# Patient Record
(demographics unavailable — no encounter records)

---

## 2024-10-14 NOTE — PHYSICAL EXAM
[General Appearance - Alert] : alert [General Appearance - In No Acute Distress] : in no acute distress [Sclera] : the sclera and conjunctiva were normal [PERRL With Normal Accommodation] : pupils were equal in size, round, and reactive to light [Extraocular Movements] : extraocular movements were intact [Outer Ear] : the ears and nose were normal in appearance [Oropharynx] : the oropharynx was normal [Neck Appearance] : the appearance of the neck was normal [Neck Cervical Mass (___cm)] : no neck mass was observed [Jugular Venous Distention Increased] : there was no jugular-venous distention [Respiration, Rhythm And Depth] : normal respiratory rhythm and effort [Exaggerated Use Of Accessory Muscles For Inspiration] : no accessory muscle use [Auscultation Breath Sounds / Voice Sounds] : lungs were clear to auscultation bilaterally [Heart Rate And Rhythm] : heart rate was normal and rhythm regular [Heart Sounds] : normal S1 and S2 [Heart Sounds Gallop] : no gallops [Murmurs] : no murmurs [Heart Sounds Pericardial Friction Rub] : no pericardial rub [Edema] : there was no peripheral edema [Veins - Varicosity Changes] : there were no varicosital changes [Bowel Sounds] : normal bowel sounds [Abdomen Soft] : soft [Abdomen Tenderness] : non-tender [No CVA Tenderness] : no ~M costovertebral angle tenderness [No Spinal Tenderness] : no spinal tenderness [Abnormal Walk] : normal gait [Involuntary Movements] : no involuntary movements were seen [Skin Color & Pigmentation] : normal skin color and pigmentation [Skin Turgor] : normal skin turgor [] : no rash [Deep Tendon Reflexes (DTR)] : deep tendon reflexes were 2+ and symmetric [Sensation] : the sensory exam was normal to light touch and pinprick [No Focal Deficits] : no focal deficits [FreeTextEntry1] : Anxious affect/mood

## 2024-10-14 NOTE — PHYSICAL EXAM
[General Appearance - Alert] : alert [General Appearance - In No Acute Distress] : in no acute distress [Sclera] : the sclera and conjunctiva were normal [PERRL With Normal Accommodation] : pupils were equal in size, round, and reactive to light [Extraocular Movements] : extraocular movements were intact [Outer Ear] : the ears and nose were normal in appearance [Oropharynx] : the oropharynx was normal [Neck Appearance] : the appearance of the neck was normal [Neck Cervical Mass (___cm)] : no neck mass was observed [Jugular Venous Distention Increased] : there was no jugular-venous distention [Respiration, Rhythm And Depth] : normal respiratory rhythm and effort [Exaggerated Use Of Accessory Muscles For Inspiration] : no accessory muscle use [Auscultation Breath Sounds / Voice Sounds] : lungs were clear to auscultation bilaterally [Heart Rate And Rhythm] : heart rate was normal and rhythm regular [Heart Sounds] : normal S1 and S2 [Heart Sounds Gallop] : no gallops [Murmurs] : no murmurs [Heart Sounds Pericardial Friction Rub] : no pericardial rub [Edema] : there was no peripheral edema [Veins - Varicosity Changes] : there were no varicosital changes [Bowel Sounds] : normal bowel sounds [Abdomen Tenderness] : non-tender [Abdomen Soft] : soft [No CVA Tenderness] : no ~M costovertebral angle tenderness [No Spinal Tenderness] : no spinal tenderness [Abnormal Walk] : normal gait [Involuntary Movements] : no involuntary movements were seen [Skin Color & Pigmentation] : normal skin color and pigmentation [Skin Turgor] : normal skin turgor [] : no rash [Deep Tendon Reflexes (DTR)] : deep tendon reflexes were 2+ and symmetric [Sensation] : the sensory exam was normal to light touch and pinprick [No Focal Deficits] : no focal deficits [FreeTextEntry1] : Anxious affect/mood

## 2024-10-14 NOTE — ASSESSMENT
[FreeTextEntry1] : Patient is a 74 yo F with PR3+ ANCA vasculitis diagnosed 2020, HTN, recent renal involvement now on rituximab  PR3+ ANCA vasculitis affecting kidney, joints - reviewed lab results in detail, at goal Continue rituximab, off steroids Has now completed two years, will discuss need to continue with rheum, given proteinuria my onus would be to continue  HTN - would continue to hold losartan for now, continue amlodipine and carvedilol for now - at goal  Gait abnormality - following with neuro, pain management, physical therapy for bursitis and tendon tear  Depression - discussed psychiatry, has her own, will reach out. Likely combination of underlying anxiety and adjustment disorder  RTC in 3-4 months with labs prior.

## 2024-10-14 NOTE — HISTORY OF PRESENT ILLNESS
[FreeTextEntry1] : Patient is a 72 yo F with PR3+ ANCA vasculitis diagnosed 2020, HTN, recent renal involvement now on rituximab presenting for followup   Major issue is depression, has a psychiatrist who doesnt take medicare but will look into others on the medicare website.   Medicare not covering further ritux, had dose in september ANCA levels excellent

## 2024-10-14 NOTE — ASSESSMENT
[FreeTextEntry1] : Patient is a 72 yo F with PR3+ ANCA vasculitis diagnosed 2020, HTN, recent renal involvement now on rituximab  PR3+ ANCA vasculitis affecting kidney, joints - reviewed lab results in detail, at goal Continue rituximab, off steroids Has now completed two years, will discuss need to continue with rheum, given proteinuria my onus would be to continue  HTN - would continue to hold losartan for now, continue amlodipine and carvedilol for now - at goal  Gait abnormality - following with neuro, pain management, physical therapy for bursitis and tendon tear  Depression - discussed psychiatry, has her own, will reach out. Likely combination of underlying anxiety and adjustment disorder  RTC in 3-4 months with labs prior.

## 2024-11-07 NOTE — DATA REVIEWED
[FreeTextEntry1] : Coronary Arteries (segment number):  LM (5): Normal.  LAD: Prox (6): Normal. Mid (7): Normal. Distal (8): Normal. D1 (9): High take off, normal. D2 (10): Minimal stenosis due to calcified and noncalcified plaque.  LCX: Prox (11): Normal. OM1(12): Normal. Mid (13): Normal. OM2 (14): Normal.  RCA: Prox (1): Normal. Mid (2): Motion, minimal stenosis Distal (3): Normal. RPDA (4): Normal. RPL (16): Normal.      {% area stenosis: Normal = 0%; Minimal = 1 to 29%; Mild = 30 to 49%; Moderate= 50 to 69%; Severe= 70 to 90%; Subtotal > or = 91%        } Lesion type: Calcified; Noncalcified; Predominantly Calcified; Predominantly Noncalcified; With Remodeling  C: Additional Cardiac Findings Aortic Dissection: None in the visualized segments of the thoracic aorta. Myocardial structural abnormality: LV: N LA: N RV: N RA: N Atrial septum: Probably normal Ventricular septum: N Pericardium: N Pericardial effusion: N  Non Cardiac Findings:  COMPARISON: CT chest 12/30/2021.  Linear atelectasis of the lingula. Tiny left Bochdalek hernia. Bilateral lung cysts. Adjacent to the cyst in the left lower lobe is a linear opacity that is unchanged. The remainder of the imaged lungs are clear. The included airways are unremarkable. The visualized mediastinum is normal. The included aorta is ectatic. Aortic calcifications. The descending thoracic aorta is tortuous. The upper abdomen is unremarkable.   IMPRESSION:  Cardiac: 1. The calcium score is mild at 12 Agatston units, which is at the 49 percentile, adjusted for age, gender and race. 2. Non-obstructive coronary disease  Non-cardiac: 1. The imaged lung cysts are unchanged. 2. Linear opacity in the left lower lobe adjacent to several lung cyst is unchanged.   Michaelle Marino M.D., Attending Cardiologist Gill Mendez M.D., Attending Radiologist  --- End of Report ---  ACC: [id number] EXAM: US DPLX CAROTIDS COMPL BI  PROCEDURE DATE: 08/19/2022    INTERPRETATION: CLINICAL INFORMATION: Syncope.  COMPARISON: None available.  TECHNIQUE: Grayscale, color and spectral Doppler examination of both carotid arteries was performed.  FINDINGS:  Small plaque in the right carotid bulb. No visible luminal carotid stenosis in either side. No significant plaque in the left carotid artery.  Peak systolic velocities are as follows:  RIGHT: PROX CCA = 100 cm/s DIST CCA = 86 cm/s PROX ICA = 70 cm/s DIST ICA = 134 cm/s ECA = 90 cm/s  LEFT: PROX CCA = 117 cm/s DIST CCA = 61 cm/s PROX ICA = 51 cm/s DIST ICA = 91 cm/s ECA = 76 cm/s  Antegrade flow is noted within both vertebral arteries.  IMPRESSION: Borderline elevated velocities in the distal right ICA without visible luminal narrowing. This finding could be due to hyperdynamic flow or artifact. No significant carotid stenosis is suspected.   MR Shoulder Joint No Cont, Left             Final  No Documents Attached       EXAM: [id number] - MR SHOULDER LT  - ORDERED BY: THUAN RHODES   PROCEDURE DATE:  07/29/2022    INTERPRETATION:  CLINICAL HISTORY: 70-year-old with left shoulder pain.      FINDINGS: MRI of the left shoulder was performed in the axial, coronal and sagittal planes with proton density and fluid sensitive weighting with and without fat suppression. There is no prior study available for comparison.  Evaluation of the shoulder demonstrates tendinosis of the supraspinatus and infraspinatus tendons. Low-grade partial-thickness interstitial tearing of the subscapularis tendon (image 12, axial). The extra-articular biceps tendon is normally located within the bicipital groove. There is mild tendinosis of the intra-articular biceps tendon. There is severe degenerative arthropathy of acromioclavicular joint. There is moderate volume of fluid within the subacromial/subdeltoid bursa (image 9, coronal).  There is no significant joint effusion. Evaluation of the labrum demonstrates a the anteroinferior, posterior inferior and superior labrum are intact. The contour of the humeral head and glenoid are preserved. No significant joint effusion. There is mild irregularity of the anterior/inferior humeral head (image 14, axial). No discrete loss of the glenoid surface. Negative for axillary adenopathy. The inferior glenohumeral ligament and coracohumeral ligament are not thickened. There is no abnormal signal within the rotator interval. No fracture. No dislocation. No muscle and no bone marrow edema.      IMPRESSION:  Moderate tendinosis of the supraspinatus and infraspinatus tendons.  Low-grade interstitial tearing of the subscapularis tendon.  Subacromial/subdeltoid bursitis.       Dr. Carol Yoon can be reached at [email or web address] with questions regarding this report.  --- End of Report ---     US Joint Nonvasc Extremity Limited, Left             Final  No Documents Attached       EXAM: [id number] - US JOINT NONVASC EXT LTD LT  - ORDERED BY: THUAN RHODES   PROCEDURE DATE:  07/13/2022    INTERPRETATION:  History: Left shoulder pain.  Technique: Ultrasound of the left shoulder was performed  Findings:  There is suspicion for partial tear involving the anterior supraspinatus and upper subscapularis insertions. The rotator cuff tendons are otherwise unremarkable. The supraspinatus and infraspinatus muscles show no significant fat atrophy.  The long head of the biceps tendon is intact and unremarkable.  The acromioclavicular joint is unremarkable.  The posterior aspect of the glenohumeral joint is unremarkable.  Impression:  There is suspicion for partial tear involving the anterior supraspinatus and upper subscapularis insertions. Further evaluation with dedicated MRI of the shoulder may be of utility for more definitive characterization as clinically indicated.  --- End of Report ---       MAC CABRERA MD; Attending Radiologist This document has been electronically signed. Jul 13 2022  5:22PM      Ordered by: THUAN RHODES       Collected/Examined: [id number] 10:57AM        Verified by: THUAN RHODES [id number] 06:36PM         Result Communication: No patient communication needed at this time; Stage: Final         Performed at: [facility]       Resulted: [id number] 05:19PM       Last Updated: [id number] 06:36PM       Accession: [id number]           CAROL YOON MD; Attending Radiologist This document has been electronically signed. Jul 29 2022  2:57PM      Ordered by: THUAN RHODES       Collected/Examined: [id number] 02:32PM        Verified by: THUAN RHODES [id number] 04:11PM         Result Communication: No patient communication needed at this time; Stage: Final         Performed at: [facility]       Resulted: [id number] 02:22PM       Last Updated: 29Jul2022 04:11PM       Accession: [id number]           MR Hip No Cont, Left             Final  No Documents Attached       EXAM: [id number] - MR HIP LT  - ORDERED BY: THUAN RHODES   PROCEDURE DATE:  07/13/2022    INTERPRETATION:  History: Left hip pain  Technique: Magnetic resonance imaging of the left hip was performed without intravenous contrast according to standard protocol.  Comparison: None available  Findings:  There is mild diffuse tearing/degeneration of the acetabular labrum. There is mild fissuring of cartilage and subchondral signal abnormality involving the anterosuperior acetabulum. There is no joint effusion. There is high-grade partial tearing of the gluteus minimus enthesis and the lateral half of the gluteus medius insertion..  There is trace trochanteric bursitis. The fat planes surrounding the sciatic nerve are preserved. There is moderate proximal hamstring tendinosis.  There is no fracture or osteonecrosis.  There is no evidence of stress reaction.  The intrapelvic organs are within normal limits.  Impression:  Minimal degenerative changes of the hip.  Partial tearing of the gluteus medius and minimus insertions and associated mild trochanteric bursitis.  Mild diffuse degeneration of the acetabular labrum.  --- End of Report ---       MAC MATTHEW MBA-MOISÉS YANES; Attending Radiologist This document has been electronically signed. Jul 13 2022  2:50PM      Ordered by: THUAN RHODES       Collected/Examined: [id number] 12:59PM        Verified by: THUAN RHODES [id number] 06:35PM         Result Communication: No patient communication needed at this time; Stage: Final         Performed at: [facility]       Resulted: [id number] 02:41PM       Last Updated: 18Jul2022 06:35PM       Accession: [id number]          CT Chest No Cont             Final  No Documents Attached       EXAM:  CT CHEST  PROCEDURE DATE:  06/18/2021     INTERPRETATION:  CT SCAN OF CHEST  History: Follow-up of 1.5 cm left lower lobe nodule.  Technique: CT scan of chest performed from lung apices through lung bases. Axial, coronal, and sagittal multiplanar reformatted images were produced. Thin section axial images and axial MIPS were also produced. Intravenous contrast material was not administered, as ordered.  Comparison: CT 12/20/2020  Findings:  Lungs and large airways: Previously seen 1.5 x 1.1 cm nodule in the posterior basal segment left lower lobe is decreased in size with somewhat discoid appearance, now measuring 1.4 x 0.6 cm. Near complete resolution of previously seen left upper and lower lobe consolidation, now demonstrating mild scarring, minimal traction bronchiectasis, and reticulations. Areas of reticulation, parenchymal distortion and traction bronchiectasis in the right lower lobe and right upper lobe, with interval decrease in extent of associated groundglass opacity. Biapical pleural-parenchymal scarring. Unchanged subcentimeter perifissural nodule along the major fissure.  Pleura:  No pleural effusion.  Mediastinum and hilar regions: No thoracic lymphadenopathy.  Heart and pericardium:  Heart size is normal. No pericardial effusion.  Vessels:  Normal.  Chest wall and lower neck:  Normal.  Upper abdomen: Small hiatal hernia..  Bones: Degenerative changes in the spine..   Impression: 1.  Interval decrease in size of the left lower lobe nodular lesion, now appears to be focal scarring/atelectasis. 2.  Interval near complete resolution of left lung pneumonia. Bilateral multifocal areas of reticulation/scarring reflecting sequela of prior pneumonia.         JAVED HINSON MD; Attending Radiologist This document has been electronically signed. Jun 22 2021 12:46PM      Ordered by: ROLAND ANDRADE       Collected/Examined: 18Jun2021 04:50PM        Verified by: ROLAND ANDRADE 28Jun2021 05:42PM         Result Communication: No patient communication needed at this time; Stage: Final         Performed at: [facility]       Resulted: 22Jun2021 12:34PM       Last Updated: 28Jun2021 05:42PM       Accession: [id number]           DEXA Bone Density Axial with Vertebral Fracture Assessment             Final  No Documents Attached   Result Annotated 03Jan2022 09:15AM by NAIDA OLSON    appt pending to discuss will send to pmr and suggest ca and vit d       EXAM:  XR BONE DENS AXIAL W VERT FX  PROCEDURE DATE:  12/30/2021     INTERPRETATION:  BONE DENSITY STUDY OF THE LUMBAR SPINE AND LEFT HIP VERTEBRAL FRACTURE ANALYSIS  Clinical History: 70 year old postmenopausal female with chronic steroid use.  Baseline examination.  Procedure: Measurements of bone density were made in the lumbar spine and in the left hip using dual x-ray absorptiometry (DEXA).  Vertebral fracture analysis was also performed on a lateral image of the spine.  Results:  Left Hip (Total)  BMD       0.816 g/sq. cm. T-Score  -1.0 SD from the mean Z-Score  0.5 SD from the mean  Femoral neck BMD       0.744 g/sq. cm. T-Score  -0.9 SD from the mean Z-Score  0.9 SD from the mean  FRAX WHO fracture risk assessment tool  10 year fracture risk Major osteoporotic fracture       8.4% Hip fracture                               0.9%   Spine  BMD       0.919 g/sq. cm. T-Score  -1.2 SD from the mean Z-Score  1.0 SD from the mean  Vertebral fracture analysis shows no evidence of compression fracture   Impression:  Osteopenia of the lumbar spine. No evidence of compression fracture.   Legend: BMD = Bone mineral density T-Score = Variance from a young adult population matched for gender and ethnicity Z-Score = Variance from a population matched for age, gender and ethnicity ([employer] [facility])  --- End of Report ---       RUDI FARLEY MD; Attending Radiologist This document has been electronically signed. Dec 30 2021  1:45PM      Ordered by: NAIDA OLSON       Collected/Examined: [id number] 11:54AM        Verified by: NAIDA OLSON 03Jan2022 09:15AM         Result Communication: No patient communication needed at this time; Stage: Final         Performed at: [facility]       Resulted: [id number] 01:43PM       Last Updated: 03Jan2022 09:15AM       Accession: [id number]           CT Chest No Cont             Final  No Documents Attached       EXAM:  CT CHEST  PROCEDURE DATE:  12/30/2021     INTERPRETATION:  CT SCAN OF CHEST  History: Follow-up of left lung nodule.  Technique: CT scan of chest performed from lung apices through lung bases. Axial, coronal, and sagittal multiplanar reformatted images were produced. Thin section axial images and axial MIPS were also produced. Intravenous contrast material was not administered, as ordered.  Comparison: Comparison made with most recent chest CT from 6/18/2021 and with additional prior imaging studies dating back to 3/17/2017.  Findings:  Lungs and large airways: There is currently only a linear opacity remaining at the site of the previously biopsied solid nodule in the posterior basal segment of the left lower lobe. A few cystic spaces again surround this linear opacity, and may represent bronchiectasis or bullae. Mild biapical scarring. Additional areas of scarring present in both lungs.  Pleura:  No pleural effusion.  Mediastinum and hilar regions: No thoracic lymphadenopathy.  Heart and pericardium:  Heart size is normal. No pericardial effusion.  Vessels:  Mild coronary artery calcification. Mild calcified plaque aorta.  Chest wall and lower neck:  Small fat-containing Bochdalek hernia again present on the left.  Upper abdomen: Normal.  Bones: Mild pectus excavatum, with Jeremy index of 3.1. Mild dextroscoliosis.   Impression: 1. Since 6/18/2021, there is now only linear scarring remaining in the left lower lobe at the site of a previously biopsied nodule.  2. Additional areas of scarring in both lungs.  --- End of Report ---       ALEX NUNES MD; Attending Radiologist This document has been electronically signed. Shai  3 2022 10:47AM      Ordered by: ROLAND ANDRADE       Collected/Examined: [id number] 11:54AM        Verification Required       Stage: Final         Performed at: [facility]       Resulted: 03Jan2022 10:06AM       Last Updated: 03Jan2022 10:51AM       Accession: [id number]

## 2024-11-07 NOTE — ASSESSMENT
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 Patient tapered off prednisone (July 2023) and discontinued cellcept as was unable to tolerate as well. Patient doing well on rituximab maintenance every six months, last dose September 2024, ANCA now negative. Main concern for patient at this time related to musculoskeletal concerns, accompanied by bilateral leg pain while walking, for which the patient is taking Tylenol (1000mg, once a day) to alleviate symptoms, not currently in PT will restart when able. The patient deferred PT referral during today's appointment as currently managing depression symptoms, will follow up with behavioral specialist re: depression and anxiety symptoms. Most recent rituximab infusion on 9/2024, will update labs today in office, will administer flu vaccine today in office as well. Recent follow up with Dr. Serra last month, urine testing improved. Patient will follow up in 3 months or sooner as needed.

## 2024-11-07 NOTE — PHYSICAL EXAM
[General Appearance - Alert] : alert [General Appearance - In No Acute Distress] : in no acute distress [Auscultation Breath Sounds / Voice Sounds] : lungs were clear to auscultation bilaterally [Heart Rate And Rhythm] : heart rate was normal and rhythm regular [Heart Sounds] : normal S1 and S2 [Murmurs] : no murmurs [Full Pulse] : the pedal pulses are present [Edema] : there was no peripheral edema [Abnormal Walk] : normal gait [Nail Clubbing] : no clubbing  or cyanosis of the fingernails [Musculoskeletal - Swelling] : no joint swelling seen [Motor Tone] : muscle strength and tone were normal [Skin Color & Pigmentation] : normal skin color and pigmentation [Skin Turgor] : normal skin turgor [Oriented To Time, Place, And Person] : oriented to person, place, and time [Impaired Insight] : insight and judgment were intact [Affect] : the affect was normal [General Appearance - Well-Appearing] : healthy appearing [] : normal voice and communication [Sclera] : the sclera and conjunctiva were normal [Examination Of The Oral Cavity] : the lips and gums were normal [Respiration, Rhythm And Depth] : normal respiratory rhythm and effort [Exaggerated Use Of Accessory Muscles For Inspiration] : no accessory muscle use [No Spinal Tenderness] : no spinal tenderness [FreeTextEntry1] : No active synovitis of the upper and lower extremities bilaterally.

## 2024-11-07 NOTE — HISTORY OF PRESENT ILLNESS
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 Reviewed medications with patient. Discontinued Cymbalta, methocarbamol Patient with increase depressive symptoms, following [facility] therapist, but discussed with PMD regarding referral to mental health as may be interested in starting medication regimen.  Patient not currently attending PT, feels may be more motivated when depressive symptoms improve Walking for exercise  Will receive influenza vaccination during today's visit  No chest pain, no shortness of breath, no cough, no peripheral edema, no rashes Feels some residual left hand tremor (much improved since stopping prednisone but not fully resolved), discussed follow up with neurology as well.  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 Patient overall doing well in terms of vasculitis symptoms Major concern related to pain and discomfort in muscles throughout the body takes acetaminophen (1000mg, once a day) alleviates symptoms  The patient is currently experiencing pain in the neck for which she received nerve blockers and steroid injections with minimal benefit, feels PT makes the symptoms worse  Feels leg pain with ambulation, walks everyday but minimal distance No peripheral edema, no chest pain or shortness of breath, no rashes or fevers Patient recently start zetia by cardiologist, will repeat lipid panel today as well Patient is due for next rituximab 9/2024, still pending PA Blood pressure well controlled today  May 9, 2024 Patient returns for follow up Patient feeling better but still not at baseline Patient has noted new DIP changes of the second fingers bilaterally which are new Rash around the neck line Sometimes has pruritic skin on the left eyelid Seeing Dr. Dias for injections for the nneck pain, minimal benefit to date, has appointment for follow up pending Started on cymbalta 20 mg qday, no side effects but minimal benefit to date Still with difficulty walking , walks about 4 blocks, trying to walk more, better than previously. Exercises at home as well Will have follow up with GI as well for results of stool testing Feels some tremor remains after the steroids completed, but improving over tme  February 1, 2024 Patient returns for follow up for ANCA positive vasculitis At this time, major concern related to neck and low back pain Patient reports pain in right side of neck, back and hips Patient following with pain management as well as orthopedist Reports change in hair texture since stopping steroids Patient is currently taking Crestor, planning to switch to Lipitor Takes amlodipine and carvedilol  Treated with Rituximab, next treatment March 14 Does not feel relief with Tylenol in terms of pain Has not tried CBD oil in the past, will discuss with Dr. Dias No longer doing formal physical therapy at this time, but does the PT exercises at home Following with Dr. Dias in physical medicine, not yet feeling benefit, next appointment in two weeks Reviewed recent blood work with patient, kidney function improved, proteinuria decreased to 0.6 Discussed long term treatment options including rituximab maintenance   November 1, 2023 Patient returns for follow up for ANCA positive vasculitis.   Patient evaluated by orthopedist earlier today, recommended PT Reviewed MRI results  Reviewed recent lab results Patient continues: Medications: amlodipine 5 carvedilol 6.25 bid crestor 20 lorazepam 0.5 mg as needed  stopped prednisone July 2023 mupricin for the nose metronidazole for rosacea rituximab 9/14/2023, next dose in March 2023 Patient feels unsteady due to difficulty in gait Was recently started on celexa, unclear benefit to date DEXA completed, results reviewed t score -2.0 femoral neck  June 1, 2023 Patient returns for follow up Patient with left sided back pain following fall when wooden arm of chair fell onto back Was seen in the emergency room last week, had xrays completed, no rib fracture noted Has difficulty breathing secondary to the pain, although feels may be slowly improving. ACC: [id number] EXAM: XR RIBS W PA CHEST 3 VIEWS LT ORDERED BY: PETEY BURRELL  PROCEDURE DATE: 05/27/2023    INTERPRETATION: AP chest and 3 views of left ribs.  CLINICAL INDICATION: Left chest pain after fall.  IMPRESSION: There is patchy left mid lung opacification compatible with atelectasis or pneumonia. There is a small left pleural effusion. Right lung is clear. The heart is normal in size.  --- End of Report --- Patient with decrease in pulse ox in the office with ambulation, 94% at rest, associated with shortness of breath. Patient to have CT chest today, discussed referring to emergency room but patient would like to to have CT completed

## 2024-11-07 NOTE — HISTORY OF PRESENT ILLNESS
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 Reviewed medications with patient. Discontinued Cymbalta, methocarbamol Patient with increase depressive symptoms, following [facility] therapist, but discussed with PMD regarding referral to mental health as may be interested in starting medication regimen.  Patient not currently attending PT, feels may be more motivated when depressive symptoms improve Walking for exercise  Will receive influenza vaccination during today's visit  No chest pain, no shortness of breath, no cough, no peripheral edema, no rashes Feels some residual left hand tremor (much improved since stopping prednisone but not fully resolved), discussed follow up with neurology as well.  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 Patient overall doing well in terms of vasculitis symptoms Major concern related to pain and discomfort in muscles throughout the body takes acetaminophen (1000mg, once a day) alleviates symptoms  The patient is currently experiencing pain in the neck for which she received nerve blockers and steroid injections with minimal benefit, feels PT makes the symptoms worse  Feels leg pain with ambulation, walks everyday but minimal distance No peripheral edema, no chest pain or shortness of breath, no rashes or fevers Patient recently start zetia by cardiologist, will repeat lipid panel today as well Patient is due for next rituximab 9/2024, still pending PA Blood pressure well controlled today  May 9, 2024 Patient returns for follow up Patient feeling better but still not at baseline Patient has noted new DIP changes of the second fingers bilaterally which are new Rash around the neck line Sometimes has pruritic skin on the left eyelid Seeing Dr. Dias for injections for the nneck pain, minimal benefit to date, has appointment for follow up pending Started on cymbalta 20 mg qday, no side effects but minimal benefit to date Still with difficulty walking , walks about 4 blocks, trying to walk more, better than previously. Exercises at home as well Will have follow up with GI as well for results of stool testing Feels some tremor remains after the steroids completed, but improving over tme  February 1, 2024 Patient returns for follow up for ANCA positive vasculitis At this time, major concern related to neck and low back pain Patient reports pain in right side of neck, back and hips Patient following with pain management as well as orthopedist Reports change in hair texture since stopping steroids Patient is currently taking Crestor, planning to switch to Lipitor Takes amlodipine and carvedilol  Treated with Rituximab, next treatment March 14 Does not feel relief with Tylenol in terms of pain Has not tried CBD oil in the past, will discuss with Dr. Dias No longer doing formal physical therapy at this time, but does the PT exercises at home Following with Dr. Dias in physical medicine, not yet feeling benefit, next appointment in two weeks Reviewed recent blood work with patient, kidney function improved, proteinuria decreased to 0.6 Discussed long term treatment options including rituximab maintenance   November 1, 2023 Patient returns for follow up for ANCA positive vasculitis.   Patient evaluated by orthopedist earlier today, recommended PT Reviewed MRI results  Reviewed recent lab results Patient continues: Medications: amlodipine 5 carvedilol 6.25 bid crestor 20 lorazepam 0.5 mg as needed  stopped prednisone July 2023 mupricin for the nose metronidazole for rosacea rituximab 9/14/2023, next dose in March 2023 Patient feels unsteady due to difficulty in gait Was recently started on celexa, unclear benefit to date DEXA completed, results reviewed t score -2.0 femoral neck  June 1, 2023 Patient returns for follow up Patient with left sided back pain following fall when wooden arm of chair fell onto back Was seen in the emergency room last week, had xrays completed, no rib fracture noted Has difficulty breathing secondary to the pain, although feels may be slowly improving. ACC: [id number] EXAM: XR RIBS W PA CHEST 3 VIEWS LT ORDERED BY: PETEY BURRELL  PROCEDURE DATE: 05/27/2023    INTERPRETATION: AP chest and 3 views of left ribs.  CLINICAL INDICATION: Left chest pain after fall.  IMPRESSION: There is patchy left mid lung opacification compatible with atelectasis or pneumonia. There is a small left pleural effusion. Right lung is clear. The heart is normal in size.  --- End of Report --- Patient with decrease in pulse ox in the office with ambulation, 94% at rest, associated with shortness of breath. Patient to have CT chest today, discussed referring to emergency room but patient would like to to have CT completed

## 2024-11-07 NOTE — ADDENDUM
[FreeTextEntry1] :  I, Macy Marley, acted solely as a scribe for Dr. Roxana Paiz, direction and personally dictated by me on 11/07/2024. I have reviewed the chart and agree that the record accurately reflects my personal performance of the history, physical exam, assessment, and plan. I have also personally directed, reviewed, and agreed with the chart.

## 2024-11-07 NOTE — DATA REVIEWED
[FreeTextEntry1] : Coronary Arteries (segment number):  LM (5): Normal.  LAD: Prox (6): Normal. Mid (7): Normal. Distal (8): Normal. D1 (9): High take off, normal. D2 (10): Minimal stenosis due to calcified and noncalcified plaque.  LCX: Prox (11): Normal. OM1(12): Normal. Mid (13): Normal. OM2 (14): Normal.  RCA: Prox (1): Normal. Mid (2): Motion, minimal stenosis Distal (3): Normal. RPDA (4): Normal. RPL (16): Normal.      {% area stenosis: Normal = 0%; Minimal = 1 to 29%; Mild = 30 to 49%; Moderate= 50 to 69%; Severe= 70 to 90%; Subtotal > or = 91%        } Lesion type: Calcified; Noncalcified; Predominantly Calcified; Predominantly Noncalcified; With Remodeling  C: Additional Cardiac Findings Aortic Dissection: None in the visualized segments of the thoracic aorta. Myocardial structural abnormality: LV: N LA: N RV: N RA: N Atrial septum: Probably normal Ventricular septum: N Pericardium: N Pericardial effusion: N  Non Cardiac Findings:  COMPARISON: CT chest 12/30/2021.  Linear atelectasis of the lingula. Tiny left Bochdalek hernia. Bilateral lung cysts. Adjacent to the cyst in the left lower lobe is a linear opacity that is unchanged. The remainder of the imaged lungs are clear. The included airways are unremarkable. The visualized mediastinum is normal. The included aorta is ectatic. Aortic calcifications. The descending thoracic aorta is tortuous. The upper abdomen is unremarkable.   IMPRESSION:  Cardiac: 1. The calcium score is mild at 12 Agatston units, which is at the 49 percentile, adjusted for age, gender and race. 2. Non-obstructive coronary disease  Non-cardiac: 1. The imaged lung cysts are unchanged. 2. Linear opacity in the left lower lobe adjacent to several lung cyst is unchanged.   Michaelle Marino M.D., Attending Cardiologist Gill Mendez M.D., Attending Radiologist  --- End of Report ---  ACC: [id number] EXAM: US DPLX CAROTIDS COMPL BI  PROCEDURE DATE: 08/19/2022    INTERPRETATION: CLINICAL INFORMATION: Syncope.  COMPARISON: None available.  TECHNIQUE: Grayscale, color and spectral Doppler examination of both carotid arteries was performed.  FINDINGS:  Small plaque in the right carotid bulb. No visible luminal carotid stenosis in either side. No significant plaque in the left carotid artery.  Peak systolic velocities are as follows:  RIGHT: PROX CCA = 100 cm/s DIST CCA = 86 cm/s PROX ICA = 70 cm/s DIST ICA = 134 cm/s ECA = 90 cm/s  LEFT: PROX CCA = 117 cm/s DIST CCA = 61 cm/s PROX ICA = 51 cm/s DIST ICA = 91 cm/s ECA = 76 cm/s  Antegrade flow is noted within both vertebral arteries.  IMPRESSION: Borderline elevated velocities in the distal right ICA without visible luminal narrowing. This finding could be due to hyperdynamic flow or artifact. No significant carotid stenosis is suspected.   MR Shoulder Joint No Cont, Left             Final  No Documents Attached       EXAM: [id number] - MR SHOULDER LT  - ORDERED BY: THUAN RHODES   PROCEDURE DATE:  07/29/2022    INTERPRETATION:  CLINICAL HISTORY: 70-year-old with left shoulder pain.      FINDINGS: MRI of the left shoulder was performed in the axial, coronal and sagittal planes with proton density and fluid sensitive weighting with and without fat suppression. There is no prior study available for comparison.  Evaluation of the shoulder demonstrates tendinosis of the supraspinatus and infraspinatus tendons. Low-grade partial-thickness interstitial tearing of the subscapularis tendon (image 12, axial). The extra-articular biceps tendon is normally located within the bicipital groove. There is mild tendinosis of the intra-articular biceps tendon. There is severe degenerative arthropathy of acromioclavicular joint. There is moderate volume of fluid within the subacromial/subdeltoid bursa (image 9, coronal).  There is no significant joint effusion. Evaluation of the labrum demonstrates a the anteroinferior, posterior inferior and superior labrum are intact. The contour of the humeral head and glenoid are preserved. No significant joint effusion. There is mild irregularity of the anterior/inferior humeral head (image 14, axial). No discrete loss of the glenoid surface. Negative for axillary adenopathy. The inferior glenohumeral ligament and coracohumeral ligament are not thickened. There is no abnormal signal within the rotator interval. No fracture. No dislocation. No muscle and no bone marrow edema.      IMPRESSION:  Moderate tendinosis of the supraspinatus and infraspinatus tendons.  Low-grade interstitial tearing of the subscapularis tendon.  Subacromial/subdeltoid bursitis.       Dr. Carol Yoon can be reached at [email or web address] with questions regarding this report.  --- End of Report ---     US Joint Nonvasc Extremity Limited, Left             Final  No Documents Attached       EXAM: [id number] - US JOINT NONVASC EXT LTD LT  - ORDERED BY: THUAN RHODES   PROCEDURE DATE:  07/13/2022    INTERPRETATION:  History: Left shoulder pain.  Technique: Ultrasound of the left shoulder was performed  Findings:  There is suspicion for partial tear involving the anterior supraspinatus and upper subscapularis insertions. The rotator cuff tendons are otherwise unremarkable. The supraspinatus and infraspinatus muscles show no significant fat atrophy.  The long head of the biceps tendon is intact and unremarkable.  The acromioclavicular joint is unremarkable.  The posterior aspect of the glenohumeral joint is unremarkable.  Impression:  There is suspicion for partial tear involving the anterior supraspinatus and upper subscapularis insertions. Further evaluation with dedicated MRI of the shoulder may be of utility for more definitive characterization as clinically indicated.  --- End of Report ---       MAC CABRERA MD; Attending Radiologist This document has been electronically signed. Jul 13 2022  5:22PM      Ordered by: THUAN RHODES       Collected/Examined: [id number] 10:57AM        Verified by: THUAN RHODES [id number] 06:36PM         Result Communication: No patient communication needed at this time; Stage: Final         Performed at: [facility]       Resulted: [id number] 05:19PM       Last Updated: [id number] 06:36PM       Accession: [id number]           CAROL YOON MD; Attending Radiologist This document has been electronically signed. Jul 29 2022  2:57PM      Ordered by: THUAN RHODES       Collected/Examined: [id number] 02:32PM        Verified by: THUAN RHODES [id number] 04:11PM         Result Communication: No patient communication needed at this time; Stage: Final         Performed at: [facility]       Resulted: [id number] 02:22PM       Last Updated: 29Jul2022 04:11PM       Accession: [id number]           MR Hip No Cont, Left             Final  No Documents Attached       EXAM: [id number] - MR HIP LT  - ORDERED BY: THUAN RHODES   PROCEDURE DATE:  07/13/2022    INTERPRETATION:  History: Left hip pain  Technique: Magnetic resonance imaging of the left hip was performed without intravenous contrast according to standard protocol.  Comparison: None available  Findings:  There is mild diffuse tearing/degeneration of the acetabular labrum. There is mild fissuring of cartilage and subchondral signal abnormality involving the anterosuperior acetabulum. There is no joint effusion. There is high-grade partial tearing of the gluteus minimus enthesis and the lateral half of the gluteus medius insertion..  There is trace trochanteric bursitis. The fat planes surrounding the sciatic nerve are preserved. There is moderate proximal hamstring tendinosis.  There is no fracture or osteonecrosis.  There is no evidence of stress reaction.  The intrapelvic organs are within normal limits.  Impression:  Minimal degenerative changes of the hip.  Partial tearing of the gluteus medius and minimus insertions and associated mild trochanteric bursitis.  Mild diffuse degeneration of the acetabular labrum.  --- End of Report ---       MAC MATTHEW MBA-MOISÉS YANES; Attending Radiologist This document has been electronically signed. Jul 13 2022  2:50PM      Ordered by: THUAN RHODES       Collected/Examined: [id number] 12:59PM        Verified by: THUAN RHODES [id number] 06:35PM         Result Communication: No patient communication needed at this time; Stage: Final         Performed at: [facility]       Resulted: [id number] 02:41PM       Last Updated: 18Jul2022 06:35PM       Accession: [id number]          CT Chest No Cont             Final  No Documents Attached       EXAM:  CT CHEST  PROCEDURE DATE:  06/18/2021     INTERPRETATION:  CT SCAN OF CHEST  History: Follow-up of 1.5 cm left lower lobe nodule.  Technique: CT scan of chest performed from lung apices through lung bases. Axial, coronal, and sagittal multiplanar reformatted images were produced. Thin section axial images and axial MIPS were also produced. Intravenous contrast material was not administered, as ordered.  Comparison: CT 12/20/2020  Findings:  Lungs and large airways: Previously seen 1.5 x 1.1 cm nodule in the posterior basal segment left lower lobe is decreased in size with somewhat discoid appearance, now measuring 1.4 x 0.6 cm. Near complete resolution of previously seen left upper and lower lobe consolidation, now demonstrating mild scarring, minimal traction bronchiectasis, and reticulations. Areas of reticulation, parenchymal distortion and traction bronchiectasis in the right lower lobe and right upper lobe, with interval decrease in extent of associated groundglass opacity. Biapical pleural-parenchymal scarring. Unchanged subcentimeter perifissural nodule along the major fissure.  Pleura:  No pleural effusion.  Mediastinum and hilar regions: No thoracic lymphadenopathy.  Heart and pericardium:  Heart size is normal. No pericardial effusion.  Vessels:  Normal.  Chest wall and lower neck:  Normal.  Upper abdomen: Small hiatal hernia..  Bones: Degenerative changes in the spine..   Impression: 1.  Interval decrease in size of the left lower lobe nodular lesion, now appears to be focal scarring/atelectasis. 2.  Interval near complete resolution of left lung pneumonia. Bilateral multifocal areas of reticulation/scarring reflecting sequela of prior pneumonia.         JAVED HINSON MD; Attending Radiologist This document has been electronically signed. Jun 22 2021 12:46PM      Ordered by: ROLAND ANDRADE       Collected/Examined: 18Jun2021 04:50PM        Verified by: ROLAND ANDRADE 28Jun2021 05:42PM         Result Communication: No patient communication needed at this time; Stage: Final         Performed at: [facility]       Resulted: 22Jun2021 12:34PM       Last Updated: 28Jun2021 05:42PM       Accession: [id number]           DEXA Bone Density Axial with Vertebral Fracture Assessment             Final  No Documents Attached   Result Annotated 03Jan2022 09:15AM by NAIDA OLSON    appt pending to discuss will send to pmr and suggest ca and vit d       EXAM:  XR BONE DENS AXIAL W VERT FX  PROCEDURE DATE:  12/30/2021     INTERPRETATION:  BONE DENSITY STUDY OF THE LUMBAR SPINE AND LEFT HIP VERTEBRAL FRACTURE ANALYSIS  Clinical History: 70 year old postmenopausal female with chronic steroid use.  Baseline examination.  Procedure: Measurements of bone density were made in the lumbar spine and in the left hip using dual x-ray absorptiometry (DEXA).  Vertebral fracture analysis was also performed on a lateral image of the spine.  Results:  Left Hip (Total)  BMD       0.816 g/sq. cm. T-Score  -1.0 SD from the mean Z-Score  0.5 SD from the mean  Femoral neck BMD       0.744 g/sq. cm. T-Score  -0.9 SD from the mean Z-Score  0.9 SD from the mean  FRAX WHO fracture risk assessment tool  10 year fracture risk Major osteoporotic fracture       8.4% Hip fracture                               0.9%   Spine  BMD       0.919 g/sq. cm. T-Score  -1.2 SD from the mean Z-Score  1.0 SD from the mean  Vertebral fracture analysis shows no evidence of compression fracture   Impression:  Osteopenia of the lumbar spine. No evidence of compression fracture.   Legend: BMD = Bone mineral density T-Score = Variance from a young adult population matched for gender and ethnicity Z-Score = Variance from a population matched for age, gender and ethnicity ([employer])  --- End of Report ---       RUDI FARLEY MD; Attending Radiologist This document has been electronically signed. Dec 30 2021  1:45PM      Ordered by: NAIDA OLSON       Collected/Examined: [id number] 11:54AM        Verified by: NAIDA OLSON 03Jan2022 09:15AM         Result Communication: No patient communication needed at this time; Stage: Final         Performed at: [facility]       Resulted: [id number] 01:43PM       Last Updated: 03Jan2022 09:15AM       Accession: [id number]           CT Chest No Cont             Final  No Documents Attached       EXAM:  CT CHEST  PROCEDURE DATE:  12/30/2021     INTERPRETATION:  CT SCAN OF CHEST  History: Follow-up of left lung nodule.  Technique: CT scan of chest performed from lung apices through lung bases. Axial, coronal, and sagittal multiplanar reformatted images were produced. Thin section axial images and axial MIPS were also produced. Intravenous contrast material was not administered, as ordered.  Comparison: Comparison made with most recent chest CT from 6/18/2021 and with additional prior imaging studies dating back to 3/17/2017.  Findings:  Lungs and large airways: There is currently only a linear opacity remaining at the site of the previously biopsied solid nodule in the posterior basal segment of the left lower lobe. A few cystic spaces again surround this linear opacity, and may represent bronchiectasis or bullae. Mild biapical scarring. Additional areas of scarring present in both lungs.  Pleura:  No pleural effusion.  Mediastinum and hilar regions: No thoracic lymphadenopathy.  Heart and pericardium:  Heart size is normal. No pericardial effusion.  Vessels:  Mild coronary artery calcification. Mild calcified plaque aorta.  Chest wall and lower neck:  Small fat-containing Bochdalek hernia again present on the left.  Upper abdomen: Normal.  Bones: Mild pectus excavatum, with Jeremy index of 3.1. Mild dextroscoliosis.   Impression: 1. Since 6/18/2021, there is now only linear scarring remaining in the left lower lobe at the site of a previously biopsied nodule.  2. Additional areas of scarring in both lungs.  --- End of Report ---       ALEX NUNES MD; Attending Radiologist This document has been electronically signed. Shai  3 2022 10:47AM      Ordered by: ROLAND ANDRADE       Collected/Examined: [id number] 11:54AM        Verification Required       Stage: Final         Performed at: [facility]       Resulted: 03Jan2022 10:06AM       Last Updated: 03Jan2022 10:51AM       Accession: [id number]

## 2024-11-07 NOTE — REVIEW OF SYSTEMS
[Negative] : Heme/Lymph [Arthralgias] : arthralgias [Anxiety] : anxiety [Depression] : depression [FreeTextEntry9] : hips and low back

## 2024-12-17 NOTE — DISCUSSION/SUMMARY
[FreeTextEntry1] : 74 yo woman with long h/o anxiety and depression now seeking to transfer care from psychiatrist of 13 years.  Has history of alcohol use disorder 2011 - 2020 but this has resolved.  Only antidepressant trial was approx 1980.  Pt reports that previous psychiatrist opposed med trials as saw pt's sx as personality traits.  For past year pt has felt increasingly hopeless and is uncertain if wants to live if cannot get helped by medication or anew therapist.    Pts medical problems most significant for ANCA vasculitis, acute kidney injury, HTN, osteopenia.  Followed actively by PCP, nephro, rheum, cardiology.    Kidney fct diminished consistently since 1/2023.    Review of labs significant for Cr 1.54, eGFR 35.  LFTs are WNL.  Discussed with pt that I need to consult with nephology re safety of rx an ssri with GFR 35.(June:"use cautiously in severe impairment."  Task sent to Dr. Serra.  Other possible meds that have issues around kidney fct: duloxetine (June: adj not necc for mild-mod impairment; not recc for severe") wellbutrin (June:"lower initial dose, less frequent, monitored closely"),  buspirone (June:"not recc for pts w severe renal impairment") gabapentin (June: 400 - 1400 mg/day in 2 doses")  Discussed use of ER if develops increased SI - she promises to do so.  Plan: obtain information from nephrology re safety of AD med trials continue lorazepam 0.5 mg tab prns - states has at least 10 days supply can consider TMS or ECT  consider MOCA plan to do safety plan add patient to psychotherapy wait list plan to do initial IAP RT 12/27/24 - in person at [facility]

## 2024-12-17 NOTE — RISK ASSESSMENT
[Clinical Records] : Clinical Records [Yes] : 1. Passive Ideation: Have you wished you were dead or wished you could go to sleep and not wake up? Yes [In last 30 days] : in the last 30 days [No] : No

## 2024-12-17 NOTE — REASON FOR VISIT
[Telehealth (audio & video) - Individual/Group] : This visit was provided via telehealth using real-time 2-way audio visual technology. [Other Location: e.g. Home (Enter Location, City,State)___] : The provider was located at [unfilled]. [Home] : The patient, [unfilled], was located at home, [unfilled], at the time of the visit. [Verbal consent obtained from patient/other participant(s)] : Verbal consent for telehealth/telephonic services obtained from patient/other participant(s) [[facility] Provider/Facility] : [facility] Provider/Facility [Patient] : Patient [FreeTextEntry1] : Anxiety and depression.  Seeking a new psychiatrist due to dissatisfaction with current provider.  Seeking psychotherapy.

## 2024-12-17 NOTE — HISTORY OF PRESENT ILLNESS
[FreeTextEntry1] : Feels that subjective anxiety has been a problem throughout her life - felt inadequate with low self esteem.   Clinical   anxiety onset when   .  's partner "locked me out" and 's children were not supportive.  Was able to function but always felt on edge.  Started weekly psychotherapy with Saji Kinsey MD and has continued on and off - last appt was 6 weeks ago.  Treatment ended as pt asked him to call her rheum and her friend and he declined. Pt felt very hurt by this.  Only medication is lorazepam 0.5 mg tab - takes 1 tab 3 - 4 days/week.  Dx and tx for autoimmune dx has contributed to anxiety and mood problems.  "Living in my disease rather than in the world."  Current mental state has been since .  "If I am not anxious I am depressed."  Feels down most of the day on most days.  Worst in morning.  Decreased interest.  No pleasure.  Minimal hope but hoping meds can help.  Appetite comes and goes.  No overall wt changes.  Watches TV until midnight and takes an hour to fall asleep.  Wakes 5:30 am.  Anxiety is worst when can't remember or find something.  Has SI when feels that this is the way will live rest of life.  Thinks of drinking self to death or wishing would die in sleep. Unsure of frequency or duration - "I do not dwell in it."  No intent or plan.  Does not see self as at risk currently.  Has difficulty concentrating, following instructions. Isolating as does not want to talk about her medical problems - "I feel like I am abusing their friendship."   Watches TV all day.  Memory is not as good but does not think this causes problems in day-to-day life.  Deneis current or h/o psychotic or manic sx. 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  Unclear if it was helpful. No h/o psych meds other than lorazepam prns.  "Dr. Kinsey thought my problems were personality traits."

## 2024-12-17 NOTE — PHYSICAL EXAM
[Average] : average [Cooperative] : cooperative [Clear] : clear [Linear/Goal Directed] : linear/goal directed [WNL] : within normal limits [FreeTextEntry1] : coughing on occassion (recovering from flu) [FreeTextEntry8] : "sad" [de-identified] : able to smile and joke

## 2024-12-17 NOTE — PSYCHOSOCIAL ASSESSMENT
[Yes, during lifetime] : Yes, during lifetime [No] : Have you ever experienced this type of event? No [FreeTextEntry2] : has a friend in [place] close friend in [place] who does not come to [place] [FreeTextEntry1] : retired 2011 [FreeTextEntry3] : Has investments - unsure how will afford home care [FreeTextEntry6] : Rent stabilized

## 2024-12-17 NOTE — FAMILY HISTORY
[FreeTextEntry1] : no h/o suicide, psych hospitalizations, drug abuse father - "drank a little too much wine" mother - "not warm"  brother - quadraplegic from [place] - "cold but made a good life for self." no children by choice - "did not want to pass on my negativity; did not think would be a good mother."

## 2024-12-27 NOTE — HISTORY OF PRESENT ILLNESS
[FreeTextEntry1] : In person.  Past 1 week:  See 12/18/24 note from nephrology - ssri considered safe.  Has had the flu and is recovering.  Would like to start ssri.  Has had approx 3 nights in past week when wished might not wake up.  Never intent or plan. [FreeTextEntry2] : Feels that subjective anxiety has been a problem throughout her life - felt inadequate with low self esteem.   Clinical   anxiety onset when   .  's partner "locked me out" and 's children were not supportive.  Was able to function but always felt on edge.  Started weekly psychotherapy with Saji Kinsey MD and has continued on and off - last appt was 6 weeks ago.  Treatment ended as pt asked him to call her rheum and her friend and he declined. Pt felt very hurt by this.  Only medication is lorazepam 0.5 mg tab - takes 1 tab 3 - 4 days/week.  Dx and tx for autoimmune dx has contributed to anxiety and mood problems.  "Living in my disease rather than in the world."  Current mental state has been since .  "If I am not anxious I am depressed."  Feels down most of the day on most days.  Worst in morning.  Decreased interest.  No pleasure.  Minimal hope but hoping meds can help.  Appetite comes and goes.  No overall wt changes.  Watches TV until midnight and takes an hour to fall asleep.  Wakes 5:30 am.  Anxiety is worst when can't remember or find something.  Has SI when feels that this is the way will live rest of life.  Thinks of drinking self to death or wishing would die in sleep. Unsure of frequency or duration - "I do not dwell in it."  No intent or plan.  Does not see self as at risk currently.  Has difficulty concentrating, following instructions. Isolating as does not want to talk about her medical problems - "I feel like I am abusing their friendship."   Watches TV all day.  Memory is not as good but does not think this causes problems in day-to-day life.  Deneis current or h/o psychotic or manic sx. 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  Unclear if it was helpful. No h/o psych meds other than lorazepam prns.  "Dr. Kinsey thought my problems were personality traits."

## 2024-12-27 NOTE — DISCUSSION/SUMMARY
[Initial Plan] : Initial Plan [Awareness of substance use issues] : awareness of substance use issues [Intelligent] : intelligent [Motivated to participate in treatment] : motivated to participate in treatment [Financially stable] : financially stable [High level of education] : high level of education [Connected to healthcare] : connected to healthcare [FreeTextEntry2] : 12/17/2025 [FreeTextEntry3] : 12/217/2024 [Mental Health] : Mental Health [Initial] : Initial [every ___ weeks] : every [unfilled] weeks [Treatment is no longer medically necessary as evidenced by:] : Treatment is no longer medically necessary as evidenced by: [Yes] : Yes [Psychiatric Provider/Prescriber] : Psychiatric Provider/Prescriber [FreeTextEntry1] : depressed mood [FreeTextEntry4] : "I would like to resume my normal life and cope with normal activities." [de-identified] : isolates, does not go out [de-identified] :  would like to meet or call a friend at least  1x/week [FreeTextEntry5] :  Psychiatry services include psychopharmacology and supportive psychotherapy as needed.  Session duration is 20 - 60 minutes as called for by clinical situation. [de-identified] : Jeremias Jason MD [de-identified] : Waiting for assignment [de-identified] : no longer takes psychiatry medication

## 2024-12-27 NOTE — REASON FOR VISIT
[FreeTextEntry1] : Anxiety and depression.  Seeking a new psychiatrist due to dissatisfaction with current provider.  Seeking psychotherapy.

## 2024-12-27 NOTE — PHYSICAL EXAM
[Average] : average [Cooperative] : cooperative [Clear] : clear [Linear/Goal Directed] : linear/goal directed [WNL] : within normal limits [FreeTextEntry1] : coughing on occassion (recovering from flu) [FreeTextEntry8] : "a little anxious" [de-identified] : able to smile and joke

## 2024-12-27 NOTE — DISCUSSION/SUMMARY
[FreeTextEntry1] : 74 yo woman with long h/o anxiety and depression now seeking to transfer care from psychiatrist of 13 years.  Has history of alcohol use disorder 2011 - 2020 but this has resolved.  Only antidepressant trial was approx 1980.  Pt reports that previous psychiatrist opposed med trials as saw pt's sx as personality traits.  For past year pt has felt increasingly hopeless and is uncertain if wants to live if cannot get helped by medication or anew therapist.    Pts medical problems most significant for ANCA vasculitis, acute kidney injury, HTN, osteopenia.  Followed actively by PCP, nephro, rheum, cardiology.    Discussed R/B/SEs of escitalopram.  Other possible meds that have issues around kidney fct: duloxetine (June: adj not necc for mild-mod impairment; not recc for severe") wellbutrin (June:"lower initial dose, less frequent, monitored closely"),  buspirone (June:"not recc for pts w severe renal impairment") gabapentin (June: 400 - 1400 mg/day in 2 doses")  IAP done.  Safety Plan done.  Discussed use of ER if develops increased SI - she promises to do so.  Plan: start escitalopram 5 mg tab q am x 1 week then 10 mg q am continue lorazepam 0.5 mg tab prns - states has at least 10 days supply can consider TMS or ECT  consider MOCA plan to do safety plan patient on psychotherapy wait list as of 12/17/24 plan to do initial IAP RT 12/27/24 - in person at [facility]

## 2024-12-27 NOTE — PHYSICAL EXAM
[2 - 2  to 4 times a month] : 2 - 2  to 4 times a month [0 - 1 or 2] : 0 - 1 or 2 [0 - Never] : 0 - Never [0 - No] : 0 - No [2] : 2 [1] : 1 [] : No [Individual reports tobacco use during the last 30 days?] : Individual reports tobacco use during the last 30 days? No [FreeTextEntry1] : 2 [SchwartzScore] : 15

## 2025-01-06 NOTE — HISTORY OF PRESENT ILLNESS
[FreeTextEntry8] : Recent Flu treated with Tamiflu Now has nausea: Likely from mucus from sinus congestion Not on nasal spray; Needs steroid nasal spray  Respiratory status stable

## 2025-01-06 NOTE — ASSESSMENT
[FreeTextEntry1] : Sinus congestion. Will Try Flonase  Respiratory status stable Started on Lexapro

## 2025-01-10 NOTE — HISTORY OF PRESENT ILLNESS
[FreeTextEntry1] : Past 2 week:  Had flu and post nasal drip.  Has had nausea - unclear if this preceded starting escitalopram 5 mg.  Pt decreased escitalopram to 2.5 mg.  Saw PCP on 1/6/25 who thought nausea due to post nasal drip.  Was rx nasal steroid.  Might be feeling less anxious.  Broke toe approx 2 wks ago.  Has continued to have occas wishes to not wake up.  Never intent or plan.  States unable to walk much since 1/2023 due to damage from steroids x 6 months.  Used to love to walk around [facility] - "this has changed my outlook."  Not drinking as only has alcohol if goes out to lunch - last time was several months ago. [FreeTextEntry2] : Feels that subjective anxiety has been a problem throughout her life - felt inadequate with low self esteem.   Clinical   anxiety onset when   .  's partner "locked me out" and 's children were not supportive.  Was able to function but always felt on edge.  Started weekly psychotherapy with Saji Kinsey MD and has continued on and off - last appt was 6 weeks ago.  Treatment ended as pt asked him to call her rheum and her friend and he declined. Pt felt very hurt by this.  Only medication is lorazepam 0.5 mg tab - takes 1 tab 3 - 4 days/week.  Dx and tx for autoimmune dx has contributed to anxiety and mood problems.  "Living in my disease rather than in the world."  Current mental state has been since .  "If I am not anxious I am depressed."  Feels down most of the day on most days.  Worst in morning.  Decreased interest.  No pleasure.  Minimal hope but hoping meds can help.  Appetite comes and goes.  No overall wt changes.  Watches TV until midnight and takes an hour to fall asleep.  Wakes 5:30 am.  Anxiety is worst when can't remember or find something.  Has SI when feels that this is the way will live rest of life.  Thinks of drinking self to death or wishing would die in sleep. Unsure of frequency or duration - "I do not dwell in it."  No intent or plan.  Does not see self as at risk currently.  Has difficulty concentrating, following instructions. Isolating as does not want to talk about her medical problems - "I feel like I am abusing their friendship."   Watches TV all day.  Memory is not as good but does not think this causes problems in day-to-day life.  Deneis current or h/o psychotic or manic sx. 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  Unclear if it was helpful. No h/o psych meds other than lorazepam prns.  "Dr. Kinsey thought my problems were personality traits."

## 2025-01-10 NOTE — DISCUSSION/SUMMARY
[FreeTextEntry1] : 74 yo woman with long h/o anxiety and depression now seeking to transfer care from psychiatrist of 13 years.  Has history of alcohol use disorder 2011 - 2020 but this has resolved.  Only antidepressant trial was approx 1980.  Pt reports that previous psychiatrist opposed med trials as saw pt's sx as personality traits.  For past year pt has felt increasingly hopeless and is uncertain if wants to live if cannot get helped by medication or anew therapist.    Pts medical problems most significant for ANCA vasculitis, acute kidney injury, HTN, osteopenia.  Followed actively by PCP, nephro, rheum, cardiology.    Other possible meds that have issues around kidney fct: duloxetine (June: adj not necc for mild-mod impairment; not recc for severe") wellbutrin (June:"lower initial dose, less frequent, monitored closely"),  buspirone (June:"not recc for pts w severe renal impairment") gabapentin (June: 400 - 1400 mg/day in 2 doses")  Pt plans to continue current escitalopram dose until no longer has nausea.  Safety plan done.  Discussed use of ER if develops increased SI - she promises to do so.  Plan: continue escitalopram 2.5 mg tab q am until nausea ends continue lorazepam 0.5 mg tab prns - states has at least 10 days supply can consider TMS or ECT  consider MOCA patient on psychotherapy wait list as of 12/17/24 next IAP to be done by therapist once assigned RT 2 wks and calls prn

## 2025-01-10 NOTE — DISCUSSION/SUMMARY
[a. Clinician Name/Contact Information: _____] : Clinician Name/Contact Information: [unfilled] [b. Clinician Name/Contact Information: _____] : Clinician Name/Contact Information: [unfilled] [c. Local ED/Urgent Care Services/Hotlines (Name/Address/Phone):] : Local ED/Urgent Care Services/Hotlines (Name/Address/Phone): [d. Suicide Prevention Lifeline Phone: [phone] ([phone]) ] : Suicide Prevention Lifeline Phone: [phone] ([phone])  [1. Helpful Person/Contact Number: _____] : 1. Helpful Person/Contact Number: [unfilled] [2. Helpful Person/Contact Number: _____] : 2. Helpful Person/Contact Number: [unfilled] [FreeTextEntry1] : 1/10/2025 [FreeTextEntry2] : pain difficulty walking if frustrated and gets anxious [FreeTextEntry3] : feeling like will snap [FreeTextEntry4] : watches tv and stream movies [FreeTextEntry5] : home health aide friends [de-identified] : [facility]   [de-identified] : does not have meds to od on [de-identified] : peace of mind

## 2025-01-10 NOTE — REASON FOR VISIT
[Telehealth (audio & video) - Individual/Group] : This visit was provided via telehealth using real-time 2-way audio visual technology. [Medical Office: ([facility])___] : The provider was located at the medical office in [unfilled]. [Home] : The patient, [unfilled], was located at home, [unfilled], at the time of the visit. [Verbal consent obtained from patient/other participant(s)] : Verbal consent for telehealth/telephonic services obtained from patient/other participant(s) [FreeTextEntry1] : Anxiety and depression.  Seeking a new psychiatrist due to dissatisfaction with current provider.  Seeking psychotherapy.

## 2025-01-10 NOTE — PHYSICAL EXAM
[Average] : average [Cooperative] : cooperative [Clear] : clear [Linear/Goal Directed] : linear/goal directed [WNL] : within normal limits [FreeTextEntry1] : coughing on occassion (recovering from flu) [FreeTextEntry8] : "ok" [de-identified] : able to smile and joke

## 2025-01-10 NOTE — RISK ASSESSMENT
[Yes, patient reports ideation or behavior] : Yes, patient reports ideation or behavior [No] : No [Mood disorder] : mood disorder [Depressed mood/Anhedonia] : depressed mood/anhedonia [Hopelessness or despair] : hopelessness or despair [Chronic pain/other acute medical condition] : chronic pain or other acute medical condition [Identifies reasons for living] : identifies reasons for living [Supportive social network of family or friends] : supportive social network of family or friends [Moderate acute suicide risk] : Moderate acute suicide risk [Yes] : Safety Plan completed/updated (for individuals at risk): Yes [TextBox_32] : considers OD if thinks of killing self  [FreeTextEntry2] : "not as great as before but when I am suffering physically it makes me wonder." [FreeTextEntry1] : Hopes to resume activities ie write articles for a local paper or take photographs, meet friends for lunch.  Has 2 close friends.

## 2025-01-24 NOTE — PHYSICAL EXAM
[Average] : average [Cooperative] : cooperative [Clear] : clear [Linear/Goal Directed] : linear/goal directed [WNL] : within normal limits [FreeTextEntry8] : "ok" [de-identified] : able to smile and joke

## 2025-01-24 NOTE — DISCUSSION/SUMMARY
[FreeTextEntry1] : 74 yo woman with long h/o anxiety and depression now seeking to transfer care from psychiatrist of 13 years.  Has history of alcohol use disorder 2011 - 2020 but this has resolved.  Only antidepressant trial was approx 1980.  Pt reports that previous psychiatrist opposed med trials as saw pt's sx as personality traits.  For past year pt has felt increasingly hopeless and is uncertain if wants to live if cannot get helped by medication or anew therapist.    Pts medical problems most significant for ANCA vasculitis, acute kidney injury, HTN, osteopenia.  Followed actively by PCP, nephro, rheum, cardiology.    extenssive review of sleep techniques.  Other possible meds that have issues around kidney fct: duloxetine (June: adj not necc for mild-mod impairment; not recc for severe") wellbutrin (June:"lower initial dose, less frequent, monitored closely"),  buspirone (June:"not recc for pts w severe renal impairment") gabapentin (June: 400 - 1400 mg/day in 2 doses")  Pt plans to continue current escitalopram dose until no longer has nausea.  Discussed use of ER if develops increased SI - she promises to do so.  Plan: continue escitalopram 5 mg tab q am (incr 1/19/24) continue lorazepam 0.5 mg tab prns - states has at least 10 days supply defer - can consider TMS or ECT  defer - consider MOCA planned start of weekly individual psychotherapy with Ange Dewitt LCSW next IAP to be done by therapist once assigned RT 2 wks and calls prn

## 2025-01-24 NOTE — HISTORY OF PRESENT ILLNESS
[FreeTextEntry1] : Past 2 week:  Chose virtual appt due to occasional nausea from post nasal drip.  Pt increased escitalopram from 2.5 to 5 mg.  Has used lorazepam at 1/2 dose 3-4x in past 2 wks.  Broken toe is healing.  No change in occas wishes to not wake up when in physical pain.  Never intent or plan. Pt c/o DFA.  Not drinking as only has alcohol if goes out to lunch - last time was several months ago.  To start weekly individual psychotherapy with Ange Dewitt LCSW - has appt scheduled for next week. [FreeTextEntry2] : Feels that subjective anxiety has been a problem throughout her life - felt inadequate with low self esteem.   Clinical   anxiety onset when   .  's partner "locked me out" and 's children were not supportive.  Was able to function but always felt on edge.  Started weekly psychotherapy with Saji Kinsey MD and has continued on and off - last appt was 6 weeks ago.  Treatment ended as pt asked him to call her rheum and her friend and he declined. Pt felt very hurt by this.  Only medication is lorazepam 0.5 mg tab - takes 1 tab 3 - 4 days/week.  Dx and tx for autoimmune dx has contributed to anxiety and mood problems.  "Living in my disease rather than in the world."  Current mental state has been since .  "If I am not anxious I am depressed."  Feels down most of the day on most days.  Worst in morning.  Decreased interest.  No pleasure.  Minimal hope but hoping meds can help.  Appetite comes and goes.  No overall wt changes.  Watches TV until midnight and takes an hour to fall asleep.  Wakes 5:30 am.  Anxiety is worst when can't remember or find something.  Has SI when feels that this is the way will live rest of life.  Thinks of drinking self to death or wishing would die in sleep. Unsure of frequency or duration - "I do not dwell in it."  No intent or plan.  Does not see self as at risk currently.  Has difficulty concentrating, following instructions. Isolating as does not want to talk about her medical problems - "I feel like I am abusing their friendship."   Watches TV all day.  Memory is not as good but does not think this causes problems in day-to-day life.  Deneis current or h/o psychotic or manic sx. States unable to walk much since 2023 due to damage from steroids x 6 months.  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  Unclear if it was helpful. No h/o psych meds other than lorazepam prns.  "Dr. Kinsey thought my problems were personality traits."

## 2025-01-24 NOTE — RISK ASSESSMENT
[Yes, patient reports ideation or behavior] : Yes, patient reports ideation or behavior [No] : No [Mood disorder] : mood disorder [Depressed mood/Anhedonia] : depressed mood/anhedonia [Hopelessness or despair] : hopelessness or despair [Chronic pain/other acute medical condition] : chronic pain or other acute medical condition [Identifies reasons for living] : identifies reasons for living [Supportive social network of family or friends] : supportive social network of family or friends [Moderate acute suicide risk] : Moderate acute suicide risk [Yes] : Safety Plan completed/updated (for individuals at risk): Yes [TextBox_32] : considers OD if thinks of killing self  [FreeTextEntry1] : Hopes to resume activities ie write articles for a local paper or take photographs, meet friends for lunch.  Has 2 close friends. [FreeTextEntry2] : "not as great as before but when I am suffering physically it makes me wonder."

## 2025-01-24 NOTE — REASON FOR VISIT
[Telehealth (audio & video) - Individual/Group] : This visit was provided via telehealth using real-time 2-way audio visual technology. [Medical Office: ([facility])___] : The provider was located at the medical office in [unfilled]. [Home] : The patient, [unfilled], was located at home, [unfilled], at the time of the visit. [Verbal consent obtained from patient/other participant(s)] : Verbal consent for telehealth/telephonic services obtained from patient/other participant(s) [FreeTextEntry1] : Anxiety and depression.  Seeking a new psychiatrist due to dissatisfaction with current provider.  Seeking psychotherapy.

## 2025-01-31 NOTE — REASON FOR VISIT
[Participant(s) identity verified] : Participant(s) identity verified. [Verbal consent obtained from patient/other participant(s)] : Verbal consent for telehealth/telephonic services obtained from patient/other participant(s) [Patient] : Patient [Patient preference] : as per patient preference [Continuity of care] : to ensure continuity of care [Telehealth (audio & video) - Individual/Group] : This visit was provided via telehealth using real-time 2-way audio visual technology. [Other Location: e.g. Home (Enter Location, City,State)___] : The provider was located at [unfilled]. [Home] : The patient, [unfilled], was located at home, [unfilled], at the time of the visit. [Patient's space is appropriate for telehealth and maintains privacy/confidentiality.] : Patient's space is appropriate for telehealth and maintains privacy/confidentiality. [FreeTextEntry4] : 3:05 pm [FreeTextEntry5] : 3:54 pm [FreeTextEntry1] : "I am isolated...I want to be able to be more social".

## 2025-01-31 NOTE — END OF VISIT
[Duration of Psychotherapy Visit (minutes spent in synchronous communication): ____] : Duration of Psychotherapy Visit (minutes spent in synchronous communication): [unfilled] [Individual Psychotherapy for 38-52 minutes] : Individual Psychotherapy for 38 - 52 minutes [Teletherapy Service Provided] : The services provided in this session were delivered via tele-therapy [Licensed Clinician] : Licensed Clinician [FreeTextEntry3] : Home [FreeTextEntry5] : [facility]

## 2025-01-31 NOTE — PHYSICAL EXAM
[1 - Monthly or less] : 1 - Monthly or less [0 - 1 or 2] : 0 - 1 or 2 [0 - Never] : 0 - Never [4] : 4 [3] : 3 [Well groomed] : well groomed [Cooperative] : cooperative [Euthymic] : euthymic [Full] : full [Clear] : clear [Linear/Goal Directed] : linear/goal directed [None] : none [None Reported] : none reported [Average] : average [WNL] : within normal limits [] : No [Individual reports tobacco use during the last 30 days?] : Individual reports tobacco use during the last 30 days? No [Individual reports use of the following tobacco products during the last 30 days?] : Individual reports use of the following tobacco products during the last 30 days? No [Individual reports current use of tobacco cessation medication or nicotine replacement therapy?] : Individual reports current use of tobacco cessation medication or nicotine replacement therapy? No [Was tobacco cessation medication and/or nicotine replacement therapy recommended?] : Was tobacco cessation medication and/or nicotine replacement therapy recommended? No [Does individual accept referral to MD for cessation medication or NRT?] : Does individual accept referral to MD for cessation medication or NRT? No [0 - No] : 0 - No [2] : 2 [FreeTextEntry1] : 1 [SchwartzScore] : 23

## 2025-01-31 NOTE — RISK ASSESSMENT
[Mood disorder] : mood disorder [Depressed mood/Anhedonia] : depressed mood/anhedonia [No, patient denies ideation or behavior] : No, patient denies ideation or behavior [Supportive social network of family or friends] : supportive social network of family or friends [Low acute suicide risk] : Low acute suicide risk [No] : No [Not clinically indicated] : Safety Plan completed/updated (for individuals at risk): Not clinically indicated [Change in provider or treatment (i.e., medications, psychotherapy, milieu)] : change in provider or treatment (i.e., medications, psychotherapy, milieu) [Triggering events leading to humiliation, shame, and/or despair] : triggering events leading to humiliation, shame, and/or despair (e.g. loss of relationship, financial or health status) (real or anticipated) [FreeTextEntry9] : As per record, Pt has "long h/o anxiety and depression now seeking to transfer care from psychiatrist of 13 years. Has history of alcohol use disorder 2011 - 2020 but this has resolved. Only antidepressant trial was approx 1980. Pt reports that previous psychiatrist opposed med trials as saw pt's sx as personality traits. For past year pt has felt increasingly hopeless and is uncertain if wants to live if cannot get helped by medication or anew therapist." Pt currently denies SI/HI.

## 2025-01-31 NOTE — END OF VISIT
[Duration of Psychotherapy Visit (minutes spent in synchronous communication): ____] : Duration of Psychotherapy Visit (minutes spent in synchronous communication): [unfilled] [Individual Psychotherapy for 38-52 minutes] : Individual Psychotherapy for 38 - 52 minutes [Teletherapy Service Provided] : The services provided in this session were delivered via tele-therapy [Licensed Clinician] : Licensed Clinician [FreeTextEntry3] : Home [FreeTextEntry5] : [facility]

## 2025-01-31 NOTE — PLAN
[Supportive Therapy] : Supportive Therapy [de-identified] : Pt arrived on time for appointment. This was an initial psychotherapy appointment. Pt and therapist focused on gathering history, building rapport, engagement and treatment goal setting. Pt discussed her health issues and how they impact her daily functioning and overall mood. She reports depressed mood and she also feels anxious due to her current life situation. Pt expressed goal of wanting to be more social. Therapist provided supportive therapy.  Pt expressed wanting to have virtual sessions. Pt has been in therapy previously, she demonstrated good insight. She denies any SI/HI. Denies any A/V hallucinations, denies any perceptional changes or psychotic symptoms. Pt was engaged and receptive to the interventions used. Patient ended session in good emotional and behavioral control.  [Recommended Frequency of Visits: ____] : Recommended frequency of visits: [unfilled] [Return in ____ week(s)] : Return in [unfilled] week(s)

## 2025-01-31 NOTE — DISCUSSION/SUMMARY
[Tobacco Screening Completed?] : Tobacco Screening Completed: Yes [Potential impact of patient’s physical health conditions on psychiatric care?] : Potential impact of patient's physical health conditions on psychiatric care: No [Annual Review of Systems Completed?] : Annual Review of Systems Completed: No [Potential impact of patientâ??s physical health conditions on psychiatric care?] : Potential impact of patient's physical health conditions on psychiatric care: No [Does patient require any additional health services or referrals?] : Does patient require any additional health services or referrals: No

## 2025-01-31 NOTE — PLAN
[Supportive Therapy] : Supportive Therapy [de-identified] : Pt arrived on time for appointment. This was an initial psychotherapy appointment. Pt and therapist focused on gathering history, building rapport, engagement and treatment goal setting. Pt discussed her health issues and how they impact her daily functioning and overall mood. She reports depressed mood and she also feels anxious due to her current life situation. Pt expressed goal of wanting to be more social. Therapist provided supportive therapy.  Pt expressed wanting to have virtual sessions. Pt has been in therapy previously, she demonstrated good insight. She denies any SI/HI. Denies any A/V hallucinations, denies any perceptional changes or psychotic symptoms. Pt was engaged and receptive to the interventions used. Patient ended session in good emotional and behavioral control.  [Recommended Frequency of Visits: ____] : Recommended frequency of visits: [unfilled] [Return in ____ week(s)] : Return in [unfilled] week(s)

## 2025-02-04 NOTE — DISCUSSION/SUMMARY
[FreeTextEntry1] : 72 yo woman with long h/o anxiety and depression now seeking to transfer care from psychiatrist of 13 years.  Has history of alcohol use disorder 2011 - 2020 but this has resolved.  Only antidepressant trial was approx 1980.  Pt reports that previous psychiatrist opposed med trials as saw pt's sx as personality traits.  For past year pt has felt increasingly hopeless and is uncertain if wants to live if cannot get helped by medication or anew therapist.  Pts medical problems most significant for ANCA vasculitis, acute kidney injury, HTN, osteopenia.  Followed actively by PCP, nephro, rheum, cardiology.    Possible response to low dose escitalopram.  Defer - Other possible meds that have issues around kidney fct: duloxetine (June: adj not necc for mild-mod impairment; not recc for severe") wellbutrin (June:"lower initial dose, less frequent, monitored closely"),  buspirone (June:"not recc for pts w severe renal impairment") gabapentin (June: 400 - 1400 mg/day in 2 doses")  Discussed use of ER if develops increased SI - she promises to do so.  Plan: continue escitalopram 5 mg tab q am (incr 1/19/24) continue lorazepam 0.5 mg tab prns  #10 defer - consider [facility] weekly individual psychotherapy with Ange Dewitt LCSW next IAP to be done by therapist once assigned RT 3 wks and calls prn (Pt aware of DOC as I will be away 2/12 -2/19)

## 2025-02-04 NOTE — PHYSICAL EXAM
[Average] : average [Cooperative] : cooperative [Clear] : clear [Linear/Goal Directed] : linear/goal directed [WNL] : within normal limits [FreeTextEntry8] : "ok" [de-identified] : able to smile and joke

## 2025-02-04 NOTE — REASON FOR VISIT
[Telehealth (audio & video) - Individual/Group] : This visit was provided via telehealth using real-time 2-way audio visual technology. [Medical Office: ([facility])___] : The provider was located at the medical office in [unfilled]. [Home] : The patient, [unfilled], was located at home, [unfilled], at the time of the visit. [Verbal consent obtained from patient/other participant(s)] : Verbal consent for telehealth/telephonic services obtained from patient/other participant(s) [FreeTextEntry1] : Anxiety and depression.  Seeking a new psychiatrist due to dissatisfaction with current provider.  Seeking psychotherapy.

## 2025-02-04 NOTE — HISTORY OF PRESENT ILLNESS
[FreeTextEntry1] : Past 2 week:   Pt increased escitalopram from 2.5 to 5 mg  on 1/19/2025.  Pt c/o nausea and plans to discuss with PCP - has been attributed to post nasal drip.   "Not as anxious, still am blue and lack of motivation."  Decrease in occas wishes to not wake up when in physical pain.  Never intent or plan.  Has continued use lorazepam at 1/2 dose 3-4x/week in past 2 wks. Using sleep techniques we discussed - No longer having DFA.  Not drinking as only has alcohol if goes out to lunch - last time was several months ago.  Has had 1 mtg for Weekly individual psychotherapy with Ange Dewitt LCSW  [FreeTextEntry2] : Feels that subjective anxiety has been a problem throughout her life - felt inadequate with low self esteem.   Clinical   anxiety onset when   .  's partner "locked me out" and 's children were not supportive.  Was able to function but always felt on edge.  Started weekly psychotherapy with Saji Kinsey MD and has continued on and off - last appt was 6 weeks ago.  Treatment ended as pt asked him to call her rheum and her friend and he declined. Pt felt very hurt by this.  Only medication is lorazepam 0.5 mg tab - takes 1 tab 3 - 4 days/week.  Dx and tx for autoimmune dx has contributed to anxiety and mood problems.  "Living in my disease rather than in the world."  Current mental state has been since .  "If I am not anxious I am depressed."  Feels down most of the day on most days.  Worst in morning.  Decreased interest.  No pleasure.  Minimal hope but hoping meds can help.  Appetite comes and goes.  No overall wt changes.  Watches TV until midnight and takes an hour to fall asleep.  Wakes 5:30 am.  Anxiety is worst when can't remember or find something.  Has SI when feels that this is the way will live rest of life.  Thinks of drinking self to death or wishing would die in sleep. Unsure of frequency or duration - "I do not dwell in it."  No intent or plan.  Does not see self as at risk currently.  Has difficulty concentrating, following instructions. Isolating as does not want to talk about her medical problems - "I feel like I am abusing their friendship."   Watches TV all day.  Memory is not as good but does not think this causes problems in day-to-day life.  Deneis current or h/o psychotic or manic sx. States unable to walk much since 2023 due to damage from steroids x 6 months.  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  Unclear if it was helpful. No h/o psych meds other than lorazepam prns.  "Dr. Kinsey thought my problems were personality traits."

## 2025-02-05 NOTE — PLAN
[Supportive Therapy] : Supportive Therapy [Recommended Frequency of Visits: ____] : Recommended frequency of visits: [unfilled] [Return in ____ week(s)] : Return in [unfilled] week(s) [de-identified] : Pt arrived to appointment on time. Pt is a 73 year old  white female who lives by herself in an apartment. Pt has transferred her care from a private therapist to [facility] for continuity of care with therapist (new provider at [facility]). Pt reports that she met her grandson for lunch last week as planned and they had a good time. She stated that she has not seen him for years. She states that he told her that he has warm memories of his childhood spending summer vacations in her and her 's apartment. She also talked about her childhood which she states was difficult with a strict and cold mother. Therapist provided support, normalized and validated Pt's feelings. She reports anxious mood to stressors. Therapist provided supportive therapy. Pt also expressed wanting to continue virtual sessions. Therapist discussed no show policy and provided therapist's contact information. Pt has been in therapy previously, she demonstrated good insight. She denies any SI/HI. Denies any A/V hallucinations, denies any perceptional changes or psychotic symptoms. Pt was engaged and receptive to the interventions used. Patient ended session in good emotional and behavioral control.

## 2025-02-05 NOTE — DISCUSSION/SUMMARY
[Tobacco Screening Completed?] : Tobacco Screening Completed: Yes [Annual Review of Systems Completed?] : Annual Review of Systems Completed: No [Potential impact of patientâ??s physical health conditions on psychiatric care?] : Potential impact of patient's physical health conditions on psychiatric care: No [Does patient require any additional health services or referrals?] : Does patient require any additional health services or referrals: No

## 2025-02-05 NOTE — PHYSICAL EXAM
Patient Instructions  1. If heart biopsy is negative, will decrease prednisone to 5 mg daily.   2. Stop Nystatin and start troches four times a day. Repeat tacro level on Saturday.   3. I will set up a Pentamidine neb and let you know.   4. Please let us know if you experience any pain with your hernia.   5. I will let you know what rheumatology says about Anakinra to help treat your reoccurring gout episodes.   6. Please wear your compression stockings if you have them. Also, elevate your feet when able.   7. I will talk with Dr. Montgomery and infectious disease to see if you can get covid vaccines now.     Next transplant clinic appointment: 4 months on 9/2 w/ labs, clinic with Dr. Montgomery, CT of chest, and rhc/hbx.   Next lab draw: Saturday 8/7.   Coordinator will call with all pending results.     Please call transplant coordinator with any questions:    Yolette Rueda RN BSN   Post Heart Transplant Nurse Coordinator  [facility]  Questions: [phone]       [Well groomed] : well groomed [Cooperative] : cooperative [Euthymic] : euthymic [Full] : full [Clear] : clear [Linear/Goal Directed] : linear/goal directed [None] : none [None Reported] : none reported [Average] : average [WNL] : within normal limits [1 - Monthly or less] : 1 - Monthly or less [0 - 1 or 2] : 0 - 1 or 2 [0 - Never] : 0 - Never [0 - No] : 0 - No [4] : 4 [3] : 3 [2] : 2 [] : No [Individual reports tobacco use during the last 30 days?] : Individual reports tobacco use during the last 30 days? No [Individual reports use of the following tobacco products during the last 30 days?] : Individual reports use of the following tobacco products during the last 30 days? No [Individual reports current use of tobacco cessation medication or nicotine replacement therapy?] : Individual reports current use of tobacco cessation medication or nicotine replacement therapy? No [Was tobacco cessation medication and/or nicotine replacement therapy recommended?] : Was tobacco cessation medication and/or nicotine replacement therapy recommended? No [Does individual accept referral to MD for cessation medication or NRT?] : Does individual accept referral to MD for cessation medication or NRT? No [FreeTextEntry1] : 1 [SchwartzScore] : 23

## 2025-02-05 NOTE — RISK ASSESSMENT
[No, patient denies ideation or behavior] : No, patient denies ideation or behavior [Mood disorder] : mood disorder [Depressed mood/Anhedonia] : depressed mood/anhedonia [Change in provider or treatment (i.e., medications, psychotherapy, milieu)] : change in provider or treatment (i.e., medications, psychotherapy, milieu) [Triggering events leading to humiliation, shame, and/or despair] : triggering events leading to humiliation, shame, and/or despair (e.g. loss of relationship, financial or health status) (real or anticipated) [Supportive social network of family or friends] : supportive social network of family or friends [Low acute suicide risk] : Low acute suicide risk [No] : No [Not clinically indicated] : Safety Plan completed/updated (for individuals at risk): Not clinically indicated [FreeTextEntry9] : As per record, Pt has "long h/o anxiety and depression now seeking to transfer care from psychiatrist of 13 years. Has history of alcohol use disorder 2011 - 2020 but this has resolved. Only antidepressant trial was approx 1980. Pt reports that previous psychiatrist opposed med trials as saw pt's sx as personality traits. For past year pt has felt increasingly hopeless and is uncertain if wants to live if cannot get helped by medication or anew therapist." Pt currently denies SI/HI.

## 2025-02-05 NOTE — END OF VISIT
[Duration of Psychotherapy Visit (minutes spent in synchronous communication): ____] : Duration of Psychotherapy Visit (minutes spent in synchronous communication): [unfilled] [Individual Psychotherapy for 38-52 minutes] : Individual Psychotherapy for 38 - 52 minutes [Teletherapy Service Provided] : The services provided in this session were delivered via tele-therapy [Licensed Clinician] : Licensed Clinician [FreeTextEntry3] : Home [FreeTextEntry5] : [facility]

## 2025-02-05 NOTE — REASON FOR VISIT
[Patient preference] : as per patient preference [Continuity of care] : to ensure continuity of care [Telehealth (audio & video) - Individual/Group] : This visit was provided via telehealth using real-time 2-way audio visual technology. [Other Location: e.g. Home (Enter Location, City,State)___] : The provider was located at [unfilled]. [Home] : The patient, [unfilled], was located at home, [unfilled], at the time of the visit. [Patient's space is appropriate for telehealth and maintains privacy/confidentiality.] : Patient's space is appropriate for telehealth and maintains privacy/confidentiality. [Participant(s) identity verified] : Participant(s) identity verified. [Verbal consent obtained from patient/other participant(s)] : Verbal consent for telehealth/telephonic services obtained from patient/other participant(s) [Patient] : Patient [FreeTextEntry4] : 11:02 am [FreeTextEntry5] : 11:48 am [FreeTextEntry1] : "I am isolated...I want to be able to be more social".

## 2025-02-20 NOTE — PLAN
[Supportive Therapy] : Supportive Therapy [Recommended Frequency of Visits: ____] : Recommended frequency of visits: [unfilled] [Return in ____ week(s)] : Return in [unfilled] week(s) [de-identified] : Pt is a 73 year old white  female who lives by herself in an apartment. Pt reports that she is doing good. She states that she has not have the need to take Lorazepam to help her sleep. Pt reports that she has been able to handle her anxiety, but not her frustration..."I don't give myself a break". Pt talked about her difficult upbringing and how this affected her life. Therapist provided psychoeducation and CBT cognitive restructuring, teach reframe. Pt ended session in good emotional and behavioral control.

## 2025-02-20 NOTE — DISCUSSION/SUMMARY
[Tobacco Screening Completed?] : Tobacco Screening Completed: Yes [Annual Review of Systems Completed?] : Annual Review of Systems Completed: No [Potential impact of patientÃ¢Â?Â?s physical health conditions on psychiatric care?] : Potential impact of patient's physical health conditions on psychiatric care: No [Does patient require any additional health services or referrals?] : Does patient require any additional health services or referrals: No

## 2025-02-20 NOTE — END OF VISIT
[Duration of Psychotherapy Visit (minutes spent in synchronous communication): ____] : Duration of Psychotherapy Visit (minutes spent in synchronous communication): [unfilled] [Individual Psychotherapy for 38-52 minutes] : Individual Psychotherapy for 38 - 52 minutes [Teletherapy Service Provided] : The services provided in this session were delivered via tele-therapy [Licensed Clinician] : Licensed Clinician [FreeTextEntry3] : Home [FreeTextEntry5] : [facility]

## 2025-02-20 NOTE — PHYSICAL EXAM
[Well groomed] : well groomed [Cooperative] : cooperative [Euthymic] : euthymic [Full] : full [Clear] : clear [Linear/Goal Directed] : linear/goal directed [None] : none [None Reported] : none reported [Average] : average [WNL] : within normal limits [1 - Monthly or less] : 1 - Monthly or less [0 - 1 or 2] : 0 - 1 or 2 [0 - Never] : 0 - Never [0 - No] : 0 - No [4] : 4 [3] : 3 [2] : 2 [] : No [Individual reports tobacco use during the last 30 days?] : Individual reports tobacco use during the last 30 days? No [Individual reports use of the following tobacco products during the last 30 days?] : Individual reports use of the following tobacco products during the last 30 days? No [Individual reports current use of tobacco cessation medication or nicotine replacement therapy?] : Individual reports current use of tobacco cessation medication or nicotine replacement therapy? No [Was tobacco cessation medication and/or nicotine replacement therapy recommended?] : Was tobacco cessation medication and/or nicotine replacement therapy recommended? No [Does individual accept referral to MD for cessation medication or NRT?] : Does individual accept referral to MD for cessation medication or NRT? No [FreeTextEntry1] : 1 [SchwartzScore] : 23

## 2025-02-20 NOTE — REASON FOR VISIT
[Patient preference] : as per patient preference [Continuity of care] : to ensure continuity of care [Telehealth (audio & video) - Individual/Group] : This visit was provided via telehealth using real-time 2-way audio visual technology. [Other Location: e.g. Home (Enter Location, City,State)___] : The provider was located at [unfilled]. [Home] : The patient, [unfilled], was located at home, [unfilled], at the time of the visit. [Patient's space is appropriate for telehealth and maintains privacy/confidentiality.] : Patient's space is appropriate for telehealth and maintains privacy/confidentiality. [Participant(s) identity verified] : Participant(s) identity verified. [Verbal consent obtained from patient/other participant(s)] : Verbal consent for telehealth/telephonic services obtained from patient/other participant(s) [Patient] : Patient [FreeTextEntry4] : 11:03 am [FreeTextEntry5] : 11:52 am [FreeTextEntry1] : "I am isolated...I want to be able to be more social".

## 2025-02-22 NOTE — PHYSICAL EXAM
[General Appearance - Alert] : alert [General Appearance - In No Acute Distress] : in no acute distress [General Appearance - Well-Appearing] : healthy appearing [Sclera] : the sclera and conjunctiva were normal [Examination Of The Oral Cavity] : the lips and gums were normal [Respiration, Rhythm And Depth] : normal respiratory rhythm and effort [Exaggerated Use Of Accessory Muscles For Inspiration] : no accessory muscle use [Auscultation Breath Sounds / Voice Sounds] : lungs were clear to auscultation bilaterally [Heart Rate And Rhythm] : heart rate was normal and rhythm regular [Heart Sounds] : normal S1 and S2 [Murmurs] : no murmurs [Edema] : there was no peripheral edema [No Spinal Tenderness] : no spinal tenderness [Abnormal Walk] : normal gait [Nail Clubbing] : no clubbing  or cyanosis of the fingernails [Musculoskeletal - Swelling] : no joint swelling seen [Motor Tone] : muscle strength and tone were normal [] : no rash [Oriented To Time, Place, And Person] : oriented to person, place, and time [Impaired Insight] : insight and judgment were intact [Affect] : the affect was normal [Mood] : the mood was normal [FreeTextEntry1] : No active synovitis of the upper and lower extremities bilaterally.

## 2025-02-22 NOTE — DATA REVIEWED
[FreeTextEntry1] :  Coronary Arteries (segment number):  LM (5): Normal.  LAD: Prox (6): Normal. Mid (7): Normal. Distal (8): Normal. D1 (9): High take off, normal. D2 (10): Minimal stenosis due to calcified and noncalcified plaque.  LCX: Prox (11): Normal. OM1(12): Normal. Mid (13): Normal. OM2 (14): Normal.  RCA: Prox (1): Normal. Mid (2): Motion, minimal stenosis Distal (3): Normal. RPDA (4): Normal. RPL (16): Normal.        {% area stenosis: Normal = 0%; Minimal = 1 to 29%; Mild = 30 to 49%; Moderate= 50 to 69%; Severe= 70 to 90%; Subtotal > or = 91%            } Lesion type: Calcified; Noncalcified; Predominantly Calcified; Predominantly Noncalcified; With Remodeling  C: Additional Cardiac Findings Aortic Dissection: None in the visualized segments of the thoracic aorta. Myocardial structural abnormality: LV: N LA: N RV: N RA: N Atrial septum: Probably normal Ventricular septum: N Pericardium: N Pericardial effusion: N  Non Cardiac Findings:  COMPARISON: CT chest 12/30/2021.  Linear atelectasis of the lingula. Tiny left Bochdalek hernia. Bilateral lung cysts. Adjacent to the cyst in the left lower lobe is a linear opacity that is unchanged. The remainder of the imaged lungs are clear. The included airways are unremarkable. The visualized mediastinum is normal. The included aorta is ectatic. Aortic calcifications. The descending thoracic aorta is tortuous. The upper abdomen is unremarkable.   IMPRESSION:  Cardiac: 1. The calcium score is mild at 12 Agatston units, which is at the 49 percentile, adjusted for age, gender and race. 2. Non-obstructive coronary disease  Non-cardiac: 1. The imaged lung cysts are unchanged. 2. Linear opacity in the left lower lobe adjacent to several lung cyst is unchanged.   Michaelle Marino M.D., Attending Cardiologist Gill Mendez M.D., Attending Radiologist  --- End of Report ---  ACC: [id number] EXAM: US DPLX CAROTIDS COMPL BI  PROCEDURE DATE: 08/19/2022    INTERPRETATION: CLINICAL INFORMATION: Syncope.  COMPARISON: None available.  TECHNIQUE: Grayscale, color and spectral Doppler examination of both carotid arteries was performed.  FINDINGS:  Small plaque in the right carotid bulb. No visible luminal carotid stenosis in either side. No significant plaque in the left carotid artery.  Peak systolic velocities are as follows:  RIGHT: PROX CCA = 100 cm/s DIST CCA = 86 cm/s PROX ICA = 70 cm/s DIST ICA = 134 cm/s ECA = 90 cm/s  LEFT: PROX CCA = 117 cm/s DIST CCA = 61 cm/s PROX ICA = 51 cm/s DIST ICA = 91 cm/s ECA = 76 cm/s  Antegrade flow is noted within both vertebral arteries.  IMPRESSION: Borderline elevated velocities in the distal right ICA without visible luminal narrowing. This finding could be due to hyperdynamic flow or artifact. No significant carotid stenosis is suspected.   MR Shoulder Joint No Cont, Left             Final  No Documents Attached       EXAM: [id number] - MR SHOULDER LT  - ORDERED BY: THUAN RHODES   PROCEDURE DATE:  07/29/2022    INTERPRETATION:  CLINICAL HISTORY: 70-year-old with left shoulder pain.      FINDINGS: MRI of the left shoulder was performed in the axial, coronal and sagittal planes with proton density and fluid sensitive weighting with and without fat suppression. There is no prior study available for comparison.  Evaluation of the shoulder demonstrates tendinosis of the supraspinatus and infraspinatus tendons. Low-grade partial-thickness interstitial tearing of the subscapularis tendon (image 12, axial). The extra-articular biceps tendon is normally located within the bicipital groove. There is mild tendinosis of the intra-articular biceps tendon. There is severe degenerative arthropathy of acromioclavicular joint. There is moderate volume of fluid within the subacromial/subdeltoid bursa (image 9, coronal).  There is no significant joint effusion. Evaluation of the labrum demonstrates a the anteroinferior, posterior inferior and superior labrum are intact. The contour of the humeral head and glenoid are preserved. No significant joint effusion. There is mild irregularity of the anterior/inferior humeral head (image 14, axial). No discrete loss of the glenoid surface. Negative for axillary adenopathy. The inferior glenohumeral ligament and coracohumeral ligament are not thickened. There is no abnormal signal within the rotator interval. No fracture. No dislocation. No muscle and no bone marrow edema.      IMPRESSION:  Moderate tendinosis of the supraspinatus and infraspinatus tendons.  Low-grade interstitial tearing of the subscapularis tendon.  Subacromial/subdeltoid bursitis.       Dr. Carol Yoon can be reached at [email or web address] with questions regarding this report.  --- End of Report ---     US Joint Nonvasc Extremity Limited, Left             Final  No Documents Attached       EXAM: [id number] - US JOINT NONVASC EXT LTD LT  - ORDERED BY: THUAN RHODES   PROCEDURE DATE:  07/13/2022    INTERPRETATION:  History: Left shoulder pain.  Technique: Ultrasound of the left shoulder was performed  Findings:  There is suspicion for partial tear involving the anterior supraspinatus and upper subscapularis insertions. The rotator cuff tendons are otherwise unremarkable. The supraspinatus and infraspinatus muscles show no significant fat atrophy.  The long head of the biceps tendon is intact and unremarkable.  The acromioclavicular joint is unremarkable.  The posterior aspect of the glenohumeral joint is unremarkable.  Impression:  There is suspicion for partial tear involving the anterior supraspinatus and upper subscapularis insertions. Further evaluation with dedicated MRI of the shoulder may be of utility for more definitive characterization as clinically indicated.  --- End of Report ---       MAC CABRERA MD; Attending Radiologist This document has been electronically signed. Jul 13 2022  5:22PM      Ordered by: THUAN RHODES       Collected/Examined: [id number] 10:57AM        Verified by: THUAN RHODES [id number] 06:36PM         Result Communication: No patient communication needed at this time; Stage: Final         Performed at: [facility]       Resulted: [id number] 05:19PM       Last Updated: [id number] 06:36PM       Accession: [id number]           CAROL YOON MD; Attending Radiologist This document has been electronically signed. Jul 29 2022  2:57PM      Ordered by: THUAN RHODES       Collected/Examined: [id number] 02:32PM        Verified by: THUAN RHODES [id number] 04:11PM         Result Communication: No patient communication needed at this time; Stage: Final         Performed at: [facility]       Resulted: [id number] 02:22PM       Last Updated: 29Jul2022 04:11PM       Accession: [id number]           MR Hip No Cont, Left             Final  No Documents Attached       EXAM: [id number] - MR HIP LT  - ORDERED BY: THUAN RHODES   PROCEDURE DATE:  07/13/2022    INTERPRETATION:  History: Left hip pain  Technique: Magnetic resonance imaging of the left hip was performed without intravenous contrast according to standard protocol.  Comparison: None available  Findings:  There is mild diffuse tearing/degeneration of the acetabular labrum. There is mild fissuring of cartilage and subchondral signal abnormality involving the anterosuperior acetabulum. There is no joint effusion. There is high-grade partial tearing of the gluteus minimus enthesis and the lateral half of the gluteus medius insertion..  There is trace trochanteric bursitis. The fat planes surrounding the sciatic nerve are preserved. There is moderate proximal hamstring tendinosis.  There is no fracture or osteonecrosis.  There is no evidence of stress reaction.  The intrapelvic organs are within normal limits.  Impression:  Minimal degenerative changes of the hip.  Partial tearing of the gluteus medius and minimus insertions and associated mild trochanteric bursitis.  Mild diffuse degeneration of the acetabular labrum.  --- End of Report ---       MAC MATTHEW MBA-MOISÉS YANES; Attending Radiologist This document has been electronically signed. Jul 13 2022  2:50PM      Ordered by: THUAN RHODES       Collected/Examined: [id number] 12:59PM        Verified by: THUAN RHODES [id number] 06:35PM         Result Communication: No patient communication needed at this time; Stage: Final         Performed at: [facility]       Resulted: [id number] 02:41PM       Last Updated: 18Jul2022 06:35PM       Accession: [id number]          CT Chest No Cont             Final  No Documents Attached       EXAM:  CT CHEST  PROCEDURE DATE:  06/18/2021     INTERPRETATION:  CT SCAN OF CHEST  History: Follow-up of 1.5 cm left lower lobe nodule.  Technique: CT scan of chest performed from lung apices through lung bases. Axial, coronal, and sagittal multiplanar reformatted images were produced. Thin section axial images and axial MIPS were also produced. Intravenous contrast material was not administered, as ordered.  Comparison: CT 12/20/2020  Findings:  Lungs and large airways: Previously seen 1.5 x 1.1 cm nodule in the posterior basal segment left lower lobe is decreased in size with somewhat discoid appearance, now measuring 1.4 x 0.6 cm. Near complete resolution of previously seen left upper and lower lobe consolidation, now demonstrating mild scarring, minimal traction bronchiectasis, and reticulations. Areas of reticulation, parenchymal distortion and traction bronchiectasis in the right lower lobe and right upper lobe, with interval decrease in extent of associated groundglass opacity. Biapical pleural-parenchymal scarring. Unchanged subcentimeter perifissural nodule along the major fissure.  Pleura:  No pleural effusion.  Mediastinum and hilar regions: No thoracic lymphadenopathy.  Heart and pericardium:  Heart size is normal. No pericardial effusion.  Vessels:  Normal.  Chest wall and lower neck:  Normal.  Upper abdomen: Small hiatal hernia..  Bones: Degenerative changes in the spine..   Impression: 1.  Interval decrease in size of the left lower lobe nodular lesion, now appears to be focal scarring/atelectasis. 2.  Interval near complete resolution of left lung pneumonia. Bilateral multifocal areas of reticulation/scarring reflecting sequela of prior pneumonia.         JAVED HINSON MD; Attending Radiologist This document has been electronically signed. Jun 22 2021 12:46PM      Ordered by: ROLAND ANDRADE       Collected/Examined: 18Jun2021 04:50PM        Verified by: ROLAND ANDRADE 28Jun2021 05:42PM         Result Communication: No patient communication needed at this time; Stage: Final         Performed at: [facility]       Resulted: 22Jun2021 12:34PM       Last Updated: 28Jun2021 05:42PM       Accession: [id number]           DEXA Bone Density Axial with Vertebral Fracture Assessment             Final  No Documents Attached   Result Annotated 03Jan2022 09:15AM by NAIDA OLSON    appt pending to discuss will send to pmr and suggest ca and vit d       EXAM:  XR BONE DENS AXIAL W VERT FX  PROCEDURE DATE:  12/30/2021     INTERPRETATION:  BONE DENSITY STUDY OF THE LUMBAR SPINE AND LEFT HIP VERTEBRAL FRACTURE ANALYSIS  Clinical History: 70 year old postmenopausal female with chronic steroid use.  Baseline examination.  Procedure: Measurements of bone density were made in the lumbar spine and in the left hip using dual x-ray absorptiometry (DEXA).  Vertebral fracture analysis was also performed on a lateral image of the spine.  Results:  Left Hip (Total)  BMD       0.816 g/sq. cm. T-Score  -1.0 SD from the mean Z-Score  0.5 SD from the mean  Femoral neck BMD       0.744 g/sq. cm. T-Score  -0.9 SD from the mean Z-Score  0.9 SD from the mean  FRAX WHO fracture risk assessment tool  10 year fracture risk Major osteoporotic fracture       8.4% Hip fracture                               0.9%   Spine  BMD       0.919 g/sq. cm. T-Score  -1.2 SD from the mean Z-Score  1.0 SD from the mean  Vertebral fracture analysis shows no evidence of compression fracture   Impression:  Osteopenia of the lumbar spine. No evidence of compression fracture.   Legend: BMD = Bone mineral density T-Score = Variance from a young adult population matched for gender and ethnicity Z-Score = Variance from a population matched for age, gender and ethnicity ([employer] W)  --- End of Report ---       RUDI FARLEY MD; Attending Radiologist This document has been electronically signed. Dec 30 2021  1:45PM      Ordered by: NAIDA OLSON       Collected/Examined: [id number] 11:54AM        Verified by: NAIDA OLSON 03Jan2022 09:15AM         Result Communication: No patient communication needed at this time; Stage: Final         Performed at: [facility]       Resulted: [id number] 01:43PM       Last Updated: 03Jan2022 09:15AM       Accession: [id number]           CT Chest No Cont             Final  No Documents Attached       EXAM:  CT CHEST  PROCEDURE DATE:  12/30/2021     INTERPRETATION:  CT SCAN OF CHEST  History: Follow-up of left lung nodule.  Technique: CT scan of chest performed from lung apices through lung bases. Axial, coronal, and sagittal multiplanar reformatted images were produced. Thin section axial images and axial MIPS were also produced. Intravenous contrast material was not administered, as ordered.  Comparison: Comparison made with most recent chest CT from 6/18/2021 and with additional prior imaging studies dating back to 3/17/2017.  Findings:  Lungs and large airways: There is currently only a linear opacity remaining at the site of the previously biopsied solid nodule in the posterior basal segment of the left lower lobe. A few cystic spaces again surround this linear opacity, and may represent bronchiectasis or bullae. Mild biapical scarring. Additional areas of scarring present in both lungs.  Pleura:  No pleural effusion.  Mediastinum and hilar regions: No thoracic lymphadenopathy.  Heart and pericardium:  Heart size is normal. No pericardial effusion.  Vessels:  Mild coronary artery calcification. Mild calcified plaque aorta.  Chest wall and lower neck:  Small fat-containing Bochdalek hernia again present on the left.  Upper abdomen: Normal.  Bones: Mild pectus excavatum, with Jeremy index of 3.1. Mild dextroscoliosis.   Impression: 1. Since 6/18/2021, there is now only linear scarring remaining in the left lower lobe at the site of a previously biopsied nodule.  2. Additional areas of scarring in both lungs.  --- End of Report ---       ALEX NUNES MD; Attending Radiologist This document has been electronically signed. Shai  3 2022 10:47AM      Ordered by: ROLAND ANDRADE       Collected/Examined: [id number] 11:54AM        Verification Required       Stage: Final         Performed at: [facility]       Resulted: 03Jan2022 10:06AM       Last Updated: 03Jan2022 10:51AM       Accession: [id number]

## 2025-02-22 NOTE — REVIEW OF SYSTEMS
[Arthralgias] : arthralgias [Negative] : Heme/Lymph [Joint Swelling] : no joint swelling [Joint Stiffness] : no joint stiffness [Limb Pain] : no limb pain

## 2025-02-22 NOTE — ASSESSMENT
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 Patient tapered off prednisone (July 2023) and discontinued cellcept as was unable to tolerate as well. Patient doing well on rituximab maintenance every six months, last dose September 2024, ANCA now negative. Main concern for patient at this time related to musculoskeletal concerns, accompanied by bilateral leg pain while walking, for which the patient is taking Tylenol (1000mg, once a day) to alleviate symptoms, not currently in PT, would like to see physiatry again, patient will discuss with PMD. Patient started low dose lexapro since last visit, reports benefit since starting. Most recent rituximab infusion on 9/2024, will update labs today in office, has follow up with Dr. Serra 2/28.  Blood pressure well controlled. Patient will follow up in 3 months or sooner as needed.

## 2025-02-22 NOTE — HISTORY OF PRESENT ILLNESS
[FreeTextEntry1] : February 19, 2025 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 Patient overall feeling well, s/p rituximab in 9/2024, no side effects noted Since last visit, started low dose lexapro with benefit to date  Main concern remains pain symptoms which feels limits activities, less exercise due to cold weather Would like to see physiatrist again, patient will discuss with PMD Patient is scheduled for maintenance rituximab dose in March, has follow up with renal next week  November 7, 2024 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 Patient overall feeling well, s/p rituximab in 9/2024, no side effects  Was evaluated with nephologist last month, urine studies reviewed with patient. Reviewed medications with patient. Discontinued Cymbalta, methocarbamol Patient with increase depressive symptoms, following [facility] therapist, but discussed with PMD regarding referral to mental health as may be interested in starting medication regimen.  Patient not currently attending PT, feels may be more motivated when depressive symptoms improve Walking for exercise  Will receive influenza vaccination during today's visit  No chest pain, no shortness of breath, no cough, no peripheral edema, no rashes Feels some residual left hand tremor (much improved since stopping prednisone but not fully resolved), discussed follow up with neurology as well.  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 Patient overall doing well in terms of vasculitis symptoms Major concern related to pain and discomfort in muscles throughout the body takes acetaminophen (1000mg, once a day) alleviates symptoms  The patient is currently experiencing pain in the neck for which she received nerve blockers and steroid injections with minimal benefit, feels PT makes the symptoms worse  Feels leg pain with ambulation, walks everyday but minimal distance No peripheral edema, no chest pain or shortness of breath, no rashes or fevers Patient recently start zetia by cardiologist, will repeat lipid panel today as well Patient is due for next rituximab 9/2024, still pending PA Blood pressure well controlled today  May 9, 2024 Patient returns for follow up Patient feeling better but still not at baseline Patient has noted new DIP changes of the second fingers bilaterally which are new Rash around the neck line Sometimes has pruritic skin on the left eyelid Seeing Dr. Dias for injections for the nneck pain, minimal benefit to date, has appointment for follow up pending Started on cymbalta 20 mg qday, no side effects but minimal benefit to date Still with difficulty walking , walks about 4 blocks, trying to walk more, better than previously. Exercises at home as well Will have follow up with GI as well for results of stool testing Feels some tremor remains after the steroids completed, but improving over tme  February 1, 2024 Patient returns for follow up for ANCA positive vasculitis At this time, major concern related to neck and low back pain Patient reports pain in right side of neck, back and hips Patient following with pain management as well as orthopedist Reports change in hair texture since stopping steroids Patient is currently taking Crestor, planning to switch to Lipitor Takes amlodipine and carvedilol  Treated with Rituximab, next treatment March 14 Does not feel relief with Tylenol in terms of pain Has not tried CBD oil in the past, will discuss with Dr. Dias No longer doing formal physical therapy at this time, but does the PT exercises at home Following with Dr. Dias in physical medicine, not yet feeling benefit, next appointment in two weeks Reviewed recent blood work with patient, kidney function improved, proteinuria decreased to 0.6 Discussed long term treatment options including rituximab maintenance   November 1, 2023 Patient returns for follow up for ANCA positive vasculitis.   Patient evaluated by orthopedist earlier today, recommended PT Reviewed MRI results  Reviewed recent lab results Patient continues: Medications: amlodipine 5 carvedilol 6.25 bid crestor 20 lorazepam 0.5 mg as needed  stopped prednisone July 2023 mupricin for the nose metronidazole for rosacea rituximab 9/14/2023, next dose in March 2023 Patient feels unsteady due to difficulty in gait Was recently started on celexa, unclear benefit to date DEXA completed, results reviewed t score -2.0 femoral neck  June 1, 2023 Patient returns for follow up Patient with left sided back pain following fall when wooden arm of chair fell onto back Was seen in the emergency room last week, had xrays completed, no rib fracture noted Has difficulty breathing secondary to the pain, although feels may be slowly improving. ACC: [id number] EXAM: XR RIBS W PA CHEST 3 VIEWS LT ORDERED BY: PETEY BURRELL  PROCEDURE DATE: 05/27/2023    INTERPRETATION: AP chest and 3 views of left ribs.  CLINICAL INDICATION: Left chest pain after fall.  IMPRESSION: There is patchy left mid lung opacification compatible with atelectasis or pneumonia. There is a small left pleural effusion. Right lung is clear. The heart is normal in size.  --- End of Report --- Patient with decrease in pulse ox in the office with ambulation, 94% at rest, associated with shortness of breath. Patient to have CT chest today, discussed referring to emergency room but patient would like to to have CT completed

## 2025-02-22 NOTE — DATA REVIEWED
[FreeTextEntry1] :  Coronary Arteries (segment number):  LM (5): Normal.  LAD: Prox (6): Normal. Mid (7): Normal. Distal (8): Normal. D1 (9): High take off, normal. D2 (10): Minimal stenosis due to calcified and noncalcified plaque.  LCX: Prox (11): Normal. OM1(12): Normal. Mid (13): Normal. OM2 (14): Normal.  RCA: Prox (1): Normal. Mid (2): Motion, minimal stenosis Distal (3): Normal. RPDA (4): Normal. RPL (16): Normal.        {% area stenosis: Normal = 0%; Minimal = 1 to 29%; Mild = 30 to 49%; Moderate= 50 to 69%; Severe= 70 to 90%; Subtotal > or = 91%            } Lesion type: Calcified; Noncalcified; Predominantly Calcified; Predominantly Noncalcified; With Remodeling  C: Additional Cardiac Findings Aortic Dissection: None in the visualized segments of the thoracic aorta. Myocardial structural abnormality: LV: N LA: N RV: N RA: N Atrial septum: Probably normal Ventricular septum: N Pericardium: N Pericardial effusion: N  Non Cardiac Findings:  COMPARISON: CT chest 12/30/2021.  Linear atelectasis of the lingula. Tiny left Bochdalek hernia. Bilateral lung cysts. Adjacent to the cyst in the left lower lobe is a linear opacity that is unchanged. The remainder of the imaged lungs are clear. The included airways are unremarkable. The visualized mediastinum is normal. The included aorta is ectatic. Aortic calcifications. The descending thoracic aorta is tortuous. The upper abdomen is unremarkable.   IMPRESSION:  Cardiac: 1. The calcium score is mild at 12 Agatston units, which is at the 49 percentile, adjusted for age, gender and race. 2. Non-obstructive coronary disease  Non-cardiac: 1. The imaged lung cysts are unchanged. 2. Linear opacity in the left lower lobe adjacent to several lung cyst is unchanged.   Michaelle Marino M.D., Attending Cardiologist Gill Mendez M.D., Attending Radiologist  --- End of Report ---  ACC: [id number] EXAM: US DPLX CAROTIDS COMPL BI  PROCEDURE DATE: 08/19/2022    INTERPRETATION: CLINICAL INFORMATION: Syncope.  COMPARISON: None available.  TECHNIQUE: Grayscale, color and spectral Doppler examination of both carotid arteries was performed.  FINDINGS:  Small plaque in the right carotid bulb. No visible luminal carotid stenosis in either side. No significant plaque in the left carotid artery.  Peak systolic velocities are as follows:  RIGHT: PROX CCA = 100 cm/s DIST CCA = 86 cm/s PROX ICA = 70 cm/s DIST ICA = 134 cm/s ECA = 90 cm/s  LEFT: PROX CCA = 117 cm/s DIST CCA = 61 cm/s PROX ICA = 51 cm/s DIST ICA = 91 cm/s ECA = 76 cm/s  Antegrade flow is noted within both vertebral arteries.  IMPRESSION: Borderline elevated velocities in the distal right ICA without visible luminal narrowing. This finding could be due to hyperdynamic flow or artifact. No significant carotid stenosis is suspected.   MR Shoulder Joint No Cont, Left             Final  No Documents Attached       EXAM: [id number] - MR SHOULDER LT  - ORDERED BY: THUAN RHODES   PROCEDURE DATE:  07/29/2022    INTERPRETATION:  CLINICAL HISTORY: 70-year-old with left shoulder pain.      FINDINGS: MRI of the left shoulder was performed in the axial, coronal and sagittal planes with proton density and fluid sensitive weighting with and without fat suppression. There is no prior study available for comparison.  Evaluation of the shoulder demonstrates tendinosis of the supraspinatus and infraspinatus tendons. Low-grade partial-thickness interstitial tearing of the subscapularis tendon (image 12, axial). The extra-articular biceps tendon is normally located within the bicipital groove. There is mild tendinosis of the intra-articular biceps tendon. There is severe degenerative arthropathy of acromioclavicular joint. There is moderate volume of fluid within the subacromial/subdeltoid bursa (image 9, coronal).  There is no significant joint effusion. Evaluation of the labrum demonstrates a the anteroinferior, posterior inferior and superior labrum are intact. The contour of the humeral head and glenoid are preserved. No significant joint effusion. There is mild irregularity of the anterior/inferior humeral head (image 14, axial). No discrete loss of the glenoid surface. Negative for axillary adenopathy. The inferior glenohumeral ligament and coracohumeral ligament are not thickened. There is no abnormal signal within the rotator interval. No fracture. No dislocation. No muscle and no bone marrow edema.      IMPRESSION:  Moderate tendinosis of the supraspinatus and infraspinatus tendons.  Low-grade interstitial tearing of the subscapularis tendon.  Subacromial/subdeltoid bursitis.       Dr. Carol Yoon can be reached at [email or web address] with questions regarding this report.  --- End of Report ---     US Joint Nonvasc Extremity Limited, Left             Final  No Documents Attached       EXAM: [id number] - US JOINT NONVASC EXT LTD LT  - ORDERED BY: THUAN RHODES   PROCEDURE DATE:  07/13/2022    INTERPRETATION:  History: Left shoulder pain.  Technique: Ultrasound of the left shoulder was performed  Findings:  There is suspicion for partial tear involving the anterior supraspinatus and upper subscapularis insertions. The rotator cuff tendons are otherwise unremarkable. The supraspinatus and infraspinatus muscles show no significant fat atrophy.  The long head of the biceps tendon is intact and unremarkable.  The acromioclavicular joint is unremarkable.  The posterior aspect of the glenohumeral joint is unremarkable.  Impression:  There is suspicion for partial tear involving the anterior supraspinatus and upper subscapularis insertions. Further evaluation with dedicated MRI of the shoulder may be of utility for more definitive characterization as clinically indicated.  --- End of Report ---       MAC CABRERA MD; Attending Radiologist This document has been electronically signed. Jul 13 2022  5:22PM      Ordered by: THUAN RHODES       Collected/Examined: [id number] 10:57AM        Verified by: THUAN RHODES [id number] 06:36PM         Result Communication: No patient communication needed at this time; Stage: Final         Performed at: [facility]       Resulted: [id number] 05:19PM       Last Updated: [id number] 06:36PM       Accession: [id number]           CAROL YOON MD; Attending Radiologist This document has been electronically signed. Jul 29 2022  2:57PM      Ordered by: THUAN RHODES       Collected/Examined: [id number] 02:32PM        Verified by: THUAN RHODES [id number] 04:11PM         Result Communication: No patient communication needed at this time; Stage: Final         Performed at: [facility]       Resulted: [id number] 02:22PM       Last Updated: 29Jul2022 04:11PM       Accession: [id number]           MR Hip No Cont, Left             Final  No Documents Attached       EXAM: [id number] - MR HIP LT  - ORDERED BY: THUAN RHODES   PROCEDURE DATE:  07/13/2022    INTERPRETATION:  History: Left hip pain  Technique: Magnetic resonance imaging of the left hip was performed without intravenous contrast according to standard protocol.  Comparison: None available  Findings:  There is mild diffuse tearing/degeneration of the acetabular labrum. There is mild fissuring of cartilage and subchondral signal abnormality involving the anterosuperior acetabulum. There is no joint effusion. There is high-grade partial tearing of the gluteus minimus enthesis and the lateral half of the gluteus medius insertion..  There is trace trochanteric bursitis. The fat planes surrounding the sciatic nerve are preserved. There is moderate proximal hamstring tendinosis.  There is no fracture or osteonecrosis.  There is no evidence of stress reaction.  The intrapelvic organs are within normal limits.  Impression:  Minimal degenerative changes of the hip.  Partial tearing of the gluteus medius and minimus insertions and associated mild trochanteric bursitis.  Mild diffuse degeneration of the acetabular labrum.  --- End of Report ---       MAC MATTHEW MBA-MOISÉS YANES; Attending Radiologist This document has been electronically signed. Jul 13 2022  2:50PM      Ordered by: THUAN RHODES       Collected/Examined: [id number] 12:59PM        Verified by: THUAN RHODES [id number] 06:35PM         Result Communication: No patient communication needed at this time; Stage: Final         Performed at: [facility]       Resulted: [id number] 02:41PM       Last Updated: 18Jul2022 06:35PM       Accession: [id number]          CT Chest No Cont             Final  No Documents Attached       EXAM:  CT CHEST  PROCEDURE DATE:  06/18/2021     INTERPRETATION:  CT SCAN OF CHEST  History: Follow-up of 1.5 cm left lower lobe nodule.  Technique: CT scan of chest performed from lung apices through lung bases. Axial, coronal, and sagittal multiplanar reformatted images were produced. Thin section axial images and axial MIPS were also produced. Intravenous contrast material was not administered, as ordered.  Comparison: CT 12/20/2020  Findings:  Lungs and large airways: Previously seen 1.5 x 1.1 cm nodule in the posterior basal segment left lower lobe is decreased in size with somewhat discoid appearance, now measuring 1.4 x 0.6 cm. Near complete resolution of previously seen left upper and lower lobe consolidation, now demonstrating mild scarring, minimal traction bronchiectasis, and reticulations. Areas of reticulation, parenchymal distortion and traction bronchiectasis in the right lower lobe and right upper lobe, with interval decrease in extent of associated groundglass opacity. Biapical pleural-parenchymal scarring. Unchanged subcentimeter perifissural nodule along the major fissure.  Pleura:  No pleural effusion.  Mediastinum and hilar regions: No thoracic lymphadenopathy.  Heart and pericardium:  Heart size is normal. No pericardial effusion.  Vessels:  Normal.  Chest wall and lower neck:  Normal.  Upper abdomen: Small hiatal hernia..  Bones: Degenerative changes in the spine..   Impression: 1.  Interval decrease in size of the left lower lobe nodular lesion, now appears to be focal scarring/atelectasis. 2.  Interval near complete resolution of left lung pneumonia. Bilateral multifocal areas of reticulation/scarring reflecting sequela of prior pneumonia.         JAVED HINSON MD; Attending Radiologist This document has been electronically signed. Jun 22 2021 12:46PM      Ordered by: ROLAND ANDRADE       Collected/Examined: 18Jun2021 04:50PM        Verified by: ROLAND ANDRADE 28Jun2021 05:42PM         Result Communication: No patient communication needed at this time; Stage: Final         Performed at: [facility]       Resulted: 22Jun2021 12:34PM       Last Updated: 28Jun2021 05:42PM       Accession: [id number]           DEXA Bone Density Axial with Vertebral Fracture Assessment             Final  No Documents Attached   Result Annotated 03Jan2022 09:15AM by NAIDA OLSON    appt pending to discuss will send to pmr and suggest ca and vit d       EXAM:  XR BONE DENS AXIAL W VERT FX  PROCEDURE DATE:  12/30/2021     INTERPRETATION:  BONE DENSITY STUDY OF THE LUMBAR SPINE AND LEFT HIP VERTEBRAL FRACTURE ANALYSIS  Clinical History: 70 year old postmenopausal female with chronic steroid use.  Baseline examination.  Procedure: Measurements of bone density were made in the lumbar spine and in the left hip using dual x-ray absorptiometry (DEXA).  Vertebral fracture analysis was also performed on a lateral image of the spine.  Results:  Left Hip (Total)  BMD       0.816 g/sq. cm. T-Score  -1.0 SD from the mean Z-Score  0.5 SD from the mean  Femoral neck BMD       0.744 g/sq. cm. T-Score  -0.9 SD from the mean Z-Score  0.9 SD from the mean  FRAX WHO fracture risk assessment tool  10 year fracture risk Major osteoporotic fracture       8.4% Hip fracture                               0.9%   Spine  BMD       0.919 g/sq. cm. T-Score  -1.2 SD from the mean Z-Score  1.0 SD from the mean  Vertebral fracture analysis shows no evidence of compression fracture   Impression:  Osteopenia of the lumbar spine. No evidence of compression fracture.   Legend: BMD = Bone mineral density T-Score = Variance from a young adult population matched for gender and ethnicity Z-Score = Variance from a population matched for age, gender and ethnicity ([employer] [facility])  --- End of Report ---       RUDI FARLEY MD; Attending Radiologist This document has been electronically signed. Dec 30 2021  1:45PM      Ordered by: NAIDA OLSON       Collected/Examined: [id number] 11:54AM        Verified by: NAIDA OLSON 03Jan2022 09:15AM         Result Communication: No patient communication needed at this time; Stage: Final         Performed at: [facility]       Resulted: [id number] 01:43PM       Last Updated: 03Jan2022 09:15AM       Accession: [id number]           CT Chest No Cont             Final  No Documents Attached       EXAM:  CT CHEST  PROCEDURE DATE:  12/30/2021     INTERPRETATION:  CT SCAN OF CHEST  History: Follow-up of left lung nodule.  Technique: CT scan of chest performed from lung apices through lung bases. Axial, coronal, and sagittal multiplanar reformatted images were produced. Thin section axial images and axial MIPS were also produced. Intravenous contrast material was not administered, as ordered.  Comparison: Comparison made with most recent chest CT from 6/18/2021 and with additional prior imaging studies dating back to 3/17/2017.  Findings:  Lungs and large airways: There is currently only a linear opacity remaining at the site of the previously biopsied solid nodule in the posterior basal segment of the left lower lobe. A few cystic spaces again surround this linear opacity, and may represent bronchiectasis or bullae. Mild biapical scarring. Additional areas of scarring present in both lungs.  Pleura:  No pleural effusion.  Mediastinum and hilar regions: No thoracic lymphadenopathy.  Heart and pericardium:  Heart size is normal. No pericardial effusion.  Vessels:  Mild coronary artery calcification. Mild calcified plaque aorta.  Chest wall and lower neck:  Small fat-containing Bochdalek hernia again present on the left.  Upper abdomen: Normal.  Bones: Mild pectus excavatum, with Jeremy index of 3.1. Mild dextroscoliosis.   Impression: 1. Since 6/18/2021, there is now only linear scarring remaining in the left lower lobe at the site of a previously biopsied nodule.  2. Additional areas of scarring in both lungs.  --- End of Report ---       ALEX NUNES MD; Attending Radiologist This document has been electronically signed. Shai  3 2022 10:47AM      Ordered by: ROLAND ANDRADE       Collected/Examined: [id number] 11:54AM        Verification Required       Stage: Final         Performed at: [facility]       Resulted: 03Jan2022 10:06AM       Last Updated: 03Jan2022 10:51AM       Accession: [id number]

## 2025-02-22 NOTE — HISTORY OF PRESENT ILLNESS
[FreeTextEntry1] : February 19, 2025 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 Patient overall feeling well, s/p rituximab in 9/2024, no side effects noted Since last visit, started low dose lexapro with benefit to date  Main concern remains pain symptoms which feels limits activities, less exercise due to cold weather Would like to see physiatrist again, patient will discuss with PMD Patient is scheduled for maintenance rituximab dose in March, has follow up with renal next week  November 7, 2024 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 Patient overall feeling well, s/p rituximab in 9/2024, no side effects  Was evaluated with nephologist last month, urine studies reviewed with patient. Reviewed medications with patient. Discontinued Cymbalta, methocarbamol Patient with increase depressive symptoms, following [facility] therapist, but discussed with PMD regarding referral to mental health as may be interested in starting medication regimen.  Patient not currently attending PT, feels may be more motivated when depressive symptoms improve Walking for exercise  Will receive influenza vaccination during today's visit  No chest pain, no shortness of breath, no cough, no peripheral edema, no rashes Feels some residual left hand tremor (much improved since stopping prednisone but not fully resolved), discussed follow up with neurology as well.  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 Patient overall doing well in terms of vasculitis symptoms Major concern related to pain and discomfort in muscles throughout the body takes acetaminophen (1000mg, once a day) alleviates symptoms  The patient is currently experiencing pain in the neck for which she received nerve blockers and steroid injections with minimal benefit, feels PT makes the symptoms worse  Feels leg pain with ambulation, walks everyday but minimal distance No peripheral edema, no chest pain or shortness of breath, no rashes or fevers Patient recently start zetia by cardiologist, will repeat lipid panel today as well Patient is due for next rituximab 9/2024, still pending PA Blood pressure well controlled today  May 9, 2024 Patient returns for follow up Patient feeling better but still not at baseline Patient has noted new DIP changes of the second fingers bilaterally which are new Rash around the neck line Sometimes has pruritic skin on the left eyelid Seeing Dr. Dias for injections for the nneck pain, minimal benefit to date, has appointment for follow up pending Started on cymbalta 20 mg qday, no side effects but minimal benefit to date Still with difficulty walking , walks about 4 blocks, trying to walk more, better than previously. Exercises at home as well Will have follow up with GI as well for results of stool testing Feels some tremor remains after the steroids completed, but improving over tme  February 1, 2024 Patient returns for follow up for ANCA positive vasculitis At this time, major concern related to neck and low back pain Patient reports pain in right side of neck, back and hips Patient following with pain management as well as orthopedist Reports change in hair texture since stopping steroids Patient is currently taking Crestor, planning to switch to Lipitor Takes amlodipine and carvedilol  Treated with Rituximab, next treatment March 14 Does not feel relief with Tylenol in terms of pain Has not tried CBD oil in the past, will discuss with Dr. Dias No longer doing formal physical therapy at this time, but does the PT exercises at home Following with Dr. Dias in physical medicine, not yet feeling benefit, next appointment in two weeks Reviewed recent blood work with patient, kidney function improved, proteinuria decreased to 0.6 Discussed long term treatment options including rituximab maintenance   November 1, 2023 Patient returns for follow up for ANCA positive vasculitis.   Patient evaluated by orthopedist earlier today, recommended PT Reviewed MRI results  Reviewed recent lab results Patient continues: Medications: amlodipine 5 carvedilol 6.25 bid crestor 20 lorazepam 0.5 mg as needed  stopped prednisone July 2023 mupricin for the nose metronidazole for rosacea rituximab 9/14/2023, next dose in March 2023 Patient feels unsteady due to difficulty in gait Was recently started on celexa, unclear benefit to date DEXA completed, results reviewed t score -2.0 femoral neck  June 1, 2023 Patient returns for follow up Patient with left sided back pain following fall when wooden arm of chair fell onto back Was seen in the emergency room last week, had xrays completed, no rib fracture noted Has difficulty breathing secondary to the pain, although feels may be slowly improving. ACC: [id number] EXAM: XR RIBS W PA CHEST 3 VIEWS LT ORDERED BY: PETEY BURRELL  PROCEDURE DATE: 05/27/2023    INTERPRETATION: AP chest and 3 views of left ribs.  CLINICAL INDICATION: Left chest pain after fall.  IMPRESSION: There is patchy left mid lung opacification compatible with atelectasis or pneumonia. There is a small left pleural effusion. Right lung is clear. The heart is normal in size.  --- End of Report --- Patient with decrease in pulse ox in the office with ambulation, 94% at rest, associated with shortness of breath. Patient to have CT chest today, discussed referring to emergency room but patient would like to to have CT completed

## 2025-02-25 NOTE — PHYSICAL EXAM
[Average] : average [Cooperative] : cooperative [Clear] : clear [Linear/Goal Directed] : linear/goal directed [WNL] : within normal limits [FreeTextEntry8] : "ok" [de-identified] : able to smile and joke

## 2025-02-25 NOTE — REASON FOR VISIT
[Telehealth (audio & video) - Individual/Group] : This visit was provided via telehealth using real-time 2-way audio visual technology. [Home] : The patient, [unfilled], was located at home, [unfilled], at the time of the visit. [Verbal consent obtained from patient/other participant(s)] : Verbal consent for telehealth/telephonic services obtained from patient/other participant(s) [Other Location: e.g. Home (Enter Location, City,State)___] : The provider was located at [unfilled]. [FreeTextEntry1] : Anxiety and depression.  Seeking a new psychiatrist due to dissatisfaction with current provider.  Seeking psychotherapy.

## 2025-02-25 NOTE — DISCUSSION/SUMMARY
[FreeTextEntry1] : 72 yo woman with long h/o anxiety and depression now seeking to transfer care from psychiatrist of 13 years.  Has history of alcohol use disorder 2011 - 2020 but this has resolved.  Only antidepressant trial was approx 1980.  Pt reports that previous psychiatrist opposed med trials as saw pt's sx as personality traits.  For past year pt has felt increasingly hopeless and is uncertain if wants to live if cannot get helped by medication or anew therapist.  Pts medical problems most significant for ANCA vasculitis, acute kidney injury, HTN, osteopenia.  Followed actively by PCP, nephro, rheum, cardiology.    Positive response to low dose escitalopram and psychotherapy.  Defer - Other possible meds that have issues around kidney fct: duloxetine (June: adj not necc for mild-mod impairment; not recc for severe") wellbutrin (June:"lower initial dose, less frequent, monitored closely"),  buspirone (June:"not recc for pts w severe renal impairment") gabapentin (June: 400 - 1400 mg/day in 2 doses")  Discussed use of ER if develops increased SI - she promises to do so.  Plan: continue escitalopram 5 mg tab q am (incr 1/19/24) continue lorazepam 0.5 mg tab prns  #10 defer - consider MOCA weekly individual psychotherapy with Ange Dewitt LCSW RT 5 wks

## 2025-02-25 NOTE — HISTORY OF PRESENT ILLNESS
[FreeTextEntry1] : Past 3 week:   "Better - have not taken lorazepam.  Easier to get up in the morning. "   Sleep is good.  No longer has nausea.    wishes to not wake up only when in physical pain - approx 2x/week.  Never intent or plan. One glass wine when goes out w friends for lunch - less than 1x/week.  Meeting weekly individual psychotherapy with Ange Dewitt LCSW and finding it helpful. [FreeTextEntry2] : Feels that subjective anxiety has been a problem throughout her life - felt inadequate with low self esteem.   Clinical   anxiety onset when   .  's partner "locked me out" and 's children were not supportive.  Was able to function but always felt on edge.  Started weekly psychotherapy with Saji Kinsey MD and has continued on and off - last appt was 6 weeks ago.  Treatment ended as pt asked him to call her rheum and her friend and he declined. Pt felt very hurt by this.  Only medication is lorazepam 0.5 mg tab - takes 1 tab 3 - 4 days/week.  Dx and tx for autoimmune dx has contributed to anxiety and mood problems.  "Living in my disease rather than in the world."  Current mental state has been since .  "If I am not anxious I am depressed."  Feels down most of the day on most days.  Worst in morning.  Decreased interest.  No pleasure.  Minimal hope but hoping meds can help.  Appetite comes and goes.  No overall wt changes.  Watches TV until midnight and takes an hour to fall asleep.  Wakes 5:30 am.  Anxiety is worst when can't remember or find something.  Has SI when feels that this is the way will live rest of life.  Thinks of drinking self to death or wishing would die in sleep. Unsure of frequency or duration - "I do not dwell in it."  No intent or plan.  Does not see self as at risk currently.  Has difficulty concentrating, following instructions. Isolating as does not want to talk about her medical problems - "I feel like I am abusing their friendship."   Watches TV all day.  Memory is not as good but does not think this causes problems in day-to-day life.  Deneis current or h/o psychotic or manic sx. States unable to walk much since 2023 due to damage from steroids x 6 months.  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  Unclear if it was helpful. No h/o psych meds other than lorazepam prns.  "Dr. Kinsey thought my problems were personality traits."

## 2025-02-27 NOTE — DISCUSSION/SUMMARY
[Annual Review of Systems Completed?] : Annual Review of Systems Completed: No [Tobacco Screening Completed?] : Tobacco Screening Completed: Yes [Potential impact of patient’s physical health conditions on psychiatric care?] : Potential impact of patient's physical health conditions on psychiatric care: No [Does patient require any additional health services or referrals?] : Does patient require any additional health services or referrals: No

## 2025-02-27 NOTE — PHYSICAL EXAM
[Well groomed] : well groomed [Cooperative] : cooperative [Euthymic] : euthymic [Full] : full [Clear] : clear [Linear/Goal Directed] : linear/goal directed [None] : none [None Reported] : none reported [Average] : average [WNL] : within normal limits [] : No [Individual reports tobacco use during the last 30 days?] : Individual reports tobacco use during the last 30 days? No [Individual reports use of the following tobacco products during the last 30 days?] : Individual reports use of the following tobacco products during the last 30 days? No [Individual reports current use of tobacco cessation medication or nicotine replacement therapy?] : Individual reports current use of tobacco cessation medication or nicotine replacement therapy? No [Was tobacco cessation medication and/or nicotine replacement therapy recommended?] : Was tobacco cessation medication and/or nicotine replacement therapy recommended? No [Does individual accept referral to MD for cessation medication or NRT?] : Does individual accept referral to MD for cessation medication or NRT? No [1 - Monthly or less] : 1 - Monthly or less [0 - 1 or 2] : 0 - 1 or 2 [0 - Never] : 0 - Never [0 - No] : 0 - No [4] : 4 [3] : 3 [2] : 2 [FreeTextEntry1] : 1 [SchwartzScore] : 23

## 2025-02-27 NOTE — RISK ASSESSMENT
[No, patient denies ideation or behavior] : No, patient denies ideation or behavior [Mood disorder] : mood disorder [Depressed mood/Anhedonia] : depressed mood/anhedonia [Change in provider or treatment (i.e., medications, psychotherapy, milieu)] : change in provider or treatment (i.e., medications, psychotherapy, milieu) [Triggering events leading to humiliation, shame, and/or despair] : triggering events leading to humiliation, shame, and/or despair (e.g. loss of relationship, financial or health status) (real or anticipated) [Supportive social network of family or friends] : supportive social network of family or friends [FreeTextEntry9] : As per record, Pt has "long h/o anxiety and depression now seeking to transfer care from psychiatrist of 13 years. Has history of alcohol use disorder 2011 - 2020 but this has resolved. Only antidepressant trial was approx 1980. Pt reports that previous psychiatrist opposed med trials as saw pt's sx as personality traits. For past year pt has felt increasingly hopeless and is uncertain if wants to live if cannot get helped by medication or anew therapist." Pt currently denies SI/HI. [Low acute suicide risk] : Low acute suicide risk [No] : No [Not clinically indicated] : Safety Plan completed/updated (for individuals at risk): Not clinically indicated

## 2025-02-27 NOTE — PLAN
[Supportive Therapy] : Supportive Therapy [de-identified] : Pt is a 73 year old white  female who lives by herself in an apartment. Pt reports that she feels better. She states that she is going out for walks now that the weather is better. She also reports that she started her routine exercises yesterday but she feels "achy" today. She states that she is going to consult with her doctor to find out "why" she is in pain. She reports that she saw specialist who informed her that she has "normal Arthritis". Pt states that at some point she is going to apply for MOW and [employer], but she is not ready to do it yet. She states that she wants to be more independent. Pt states that she feels anger when she is frustrated. Therapist provided supportive therapy and mindfulness CBT. Pt ended session in good emotional and behavioral control.  [Recommended Frequency of Visits: ____] : Recommended frequency of visits: [unfilled] [Return in ____ week(s)] : Return in [unfilled] week(s)

## 2025-02-27 NOTE — REASON FOR VISIT
[Patient preference] : as per patient preference [Continuity of care] : to ensure continuity of care [Telehealth (audio & video) - Individual/Group] : This visit was provided via telehealth using real-time 2-way audio visual technology. [Other Location: e.g. Home (Enter Location, City,State)___] : The provider was located at [unfilled]. [Home] : The patient, [unfilled], was located at home, [unfilled], at the time of the visit. [Patient's space is appropriate for telehealth and maintains privacy/confidentiality.] : Patient's space is appropriate for telehealth and maintains privacy/confidentiality. [Participant(s) identity verified] : Participant(s) identity verified. [Verbal consent obtained from patient/other participant(s)] : Verbal consent for telehealth/telephonic services obtained from patient/other participant(s) [FreeTextEntry4] : 11:02 am [FreeTextEntry5] : 11:50 am [Patient] : Patient [FreeTextEntry1] : "I am isolated...I want to be able to be more social".

## 2025-02-27 NOTE — END OF VISIT
[Duration of Psychotherapy Visit (minutes spent in synchronous communication): ____] : Duration of Psychotherapy Visit (minutes spent in synchronous communication): [unfilled] [Individual Psychotherapy for 38-52 minutes] : Individual Psychotherapy for 38 - 52 minutes [Teletherapy Service Provided] : The services provided in this session were delivered via tele-therapy [FreeTextEntry3] : Home [FreeTextEntry5] : [facility] [Licensed Clinician] : Licensed Clinician

## 2025-02-28 NOTE — HISTORY OF PRESENT ILLNESS
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  ANCA levels excellent, no worsening proteinuria Discussed diet and exercise in detail - main meal lunch. Breakfast 2 thin cream cheese sandwiches, dinner little leftovers small plate. Lunch yesterday a couple pieces of chicken cutlet veggies and string beans, brown rice.

## 2025-02-28 NOTE — ASSESSMENT
[FreeTextEntry1] : Patient is a 74 yo F with PR3+ ANCA vasculitis diagnosed 2020, HTN, recent renal involvement now on rituximab  PR3+ ANCA vasculitis affecting kidney, joints - reviewed lab results in detail, at goal Continue rituximab, off steroids Has now completed two years, But would contniue q 6 months   HTN - would continue to hold losartan for now, continue amlodipine and carvedilol for now - below goal, may need to stop amlodipine  Gait abnormality - following with neuro, pain management, physical therapy for bursitis and tendon tear  Depression - improved with low does lexapro, continue as per psych  RTC in 3-4 months with labs prior.

## 2025-03-06 NOTE — DISCUSSION/SUMMARY
[Annual Review of Systems Completed?] : Annual Review of Systems Completed: No [Tobacco Screening Completed?] : Tobacco Screening Completed: Yes [Potential impact of patientâ??s physical health conditions on psychiatric care?] : Potential impact of patient's physical health conditions on psychiatric care: No [Does patient require any additional health services or referrals?] : Does patient require any additional health services or referrals: No

## 2025-03-06 NOTE — PLAN
[Cognitive and/or Behavior Therapy] : Cognitive and/or Behavior Therapy  [Supportive Therapy] : Supportive Therapy [de-identified] : Pt is a 73 year old white  female who lives by herself in an apartment. Pt reports that she was not able to use her computer for the virtual appt because she forgot her password. Appt was Audio only. Pt reports feeling angry and frustrated about her technical difficulties. She stated that she will seek technical assistance to solve this problem. Pt talked about her childhood and her mother's inability to connect with her which she states is the reason why she feels inadequate and unable to solve problems. Therapist provided support, normalized and validated Pt's feelings. Therapist also provided supportive therapy and mindfulness CBT. Pt ended session in good emotional and behavioral control.  [Recommended Frequency of Visits: ____] : Recommended frequency of visits: [unfilled] [Return in ____ week(s)] : Return in [unfilled] week(s)

## 2025-03-06 NOTE — REASON FOR VISIT
[Patient preference] : as per patient preference [Continuity of care] : to ensure continuity of care [Technical or other issues] : Patient unable to effectively utilize tele-video due to technical or other issues. [Other Location: e.g. Home (Enter Location, City,State)___] : The provider was located at [unfilled]. [Home] : The patient, [unfilled], was located at home, [unfilled], at the time of the visit. [Patient's space is appropriate for telehealth and maintains privacy/confidentiality.] : Patient's space is appropriate for telehealth and maintains privacy/confidentiality. [Participant(s) identity verified] : Participant(s) identity verified. [Verbal consent obtained from patient/other participant(s)] : Verbal consent for telehealth/telephonic services obtained from patient/other participant(s) [FreeTextEntry4] : 11:03 am [FreeTextEntry5] : 11:50 am [Patient] : Patient [FreeTextEntry1] : "I am isolated...I want to be able to be more social".

## 2025-03-06 NOTE — END OF VISIT
[Duration of Psychotherapy Visit (minutes spent in synchronous communication): ____] : Duration of Psychotherapy Visit (minutes spent in synchronous communication): [unfilled] [Individual Psychotherapy for 38-52 minutes] : Individual Psychotherapy for 38 - 52 minutes [Teletherapy Service Provided] : The services provided in this session were delivered via tele-therapy [FreeTextEntry3] : Home [FreeTextEntry5] : [facility] [Licensed Clinician] : Licensed Clinician

## 2025-03-13 NOTE — HISTORY OF PRESENT ILLNESS
[3] : 3 [N/A] : N/A [Denies] : Denies [No] : No [Yes] : Yes [Declined] : Declined [Right upper extremity] : Right upper extremity [22g] : 22g [Medication Name: ___] : Medication Name: [unfilled] [Start Time: ___] : Medication Start Time: [unfilled] [End Time: ___] : Medication End Time: [unfilled] [IV discontinued. Intact. No signs or symptoms of IV complications noted. Time: ___] : IV discontinued. Intact. No signs or symptoms of IV complications noted. Time: [unfilled] [Patient  instructed to seek medical attention with signs and symptoms of adverse effects] : Patient  instructed to seek medical attention with signs and symptoms of adverse effects [Patient left unit in no acute distress] : Patient left unit in no acute distress [Medications administered as ordered and tolerated well.] : Medications administered as ordered and tolerated well. [de-identified] : 9:05am

## 2025-03-14 NOTE — PLAN
[Cognitive and/or Behavior Therapy] : Cognitive and/or Behavior Therapy  [Supportive Therapy] : Supportive Therapy [Recommended Frequency of Visits: ____] : Recommended frequency of visits: [unfilled] [Return in ____ week(s)] : Return in [unfilled] week(s) [de-identified] : Pt is a 73 year old white  female who lives by herself in an apartment. Pt reports that she was able to solve the computer issue that she had last week and as a result she stated that she did not have to get technical support. Pt states that she does not like that she gets frustrated and angry. She states that she blames herself when something goes wrong and "I take everything personal". Pt states that she that psychotropic medication is helping her with her mood. Pt talked about poor communication she had with her mother. Therapist provided support, normalized and validated Pt's feelings. Therapist also provided supportive therapy and mindfulness CBT. Pt ended session in good emotional and behavioral control.

## 2025-03-14 NOTE — END OF VISIT
[Duration of Psychotherapy Visit (minutes spent in synchronous communication): ____] : Duration of Psychotherapy Visit (minutes spent in synchronous communication): [unfilled] [Individual Psychotherapy for 38-52 minutes] : Individual Psychotherapy for 38 - 52 minutes [Teletherapy Service Provided] : The services provided in this session were delivered via tele-therapy [Licensed Clinician] : Licensed Clinician [FreeTextEntry3] : Home [FreeTextEntry5] : [facility]

## 2025-03-14 NOTE — REASON FOR VISIT
[Patient preference] : as per patient preference [Continuity of care] : to ensure continuity of care [Other Location: e.g. Home (Enter Location, City,State)___] : The provider was located at [unfilled]. [Home] : The patient, [unfilled], was located at home, [unfilled], at the time of the visit. [Patient's space is appropriate for telehealth and maintains privacy/confidentiality.] : Patient's space is appropriate for telehealth and maintains privacy/confidentiality. [Participant(s) identity verified] : Participant(s) identity verified. [Verbal consent obtained from patient/other participant(s)] : Verbal consent for telehealth/telephonic services obtained from patient/other participant(s) [Patient] : Patient [Telehealth (audio & video) - Individual/Group] : This visit was provided via telehealth using real-time 2-way audio visual technology. [FreeTextEntry4] : 11:00 am [FreeTextEntry5] : 11:48 am [FreeTextEntry1] : "I am isolated...I want to be able to be more social".

## 2025-03-19 NOTE — END OF VISIT
[Duration of Psychotherapy Visit (minutes spent in synchronous communication): ____] : Duration of Psychotherapy Visit (minutes spent in synchronous communication): [unfilled] [Individual Psychotherapy for 38-52 minutes] : Individual Psychotherapy for 38 - 52 minutes [Teletherapy Service Provided] : The services provided in this session were delivered via tele-therapy [FreeTextEntry3] : Home [FreeTextEntry5] : [facility] [Licensed Clinician] : Licensed Clinician

## 2025-03-19 NOTE — PLAN
[Cognitive and/or Behavior Therapy] : Cognitive and/or Behavior Therapy  [Supportive Therapy] : Supportive Therapy [de-identified] : Pt is a 73 year old white  female who lives by herself in an apartment. Pt reports that she recently found out that her banker of many years retired and she stated that she felt "out of control". She stated that it reminded her when she lost her  "what I'm going to do now". Pt stated that she is glad she is taking psychotropic medication as her reaction would have been different if she was not taking the medication. Therapist provided support, normalized and validated Pt's feelings. Pt reports that she will meet with her grandson for early dinner next week. She states that she is happy about it. Pt reports that she is trying to do breathing exercises and she noticed that when she does it at night "it only takes me a couple of minutes to fall asleep". Therapist provided supportive therapy and mindfulness CBT. Pt ended session in good emotional and behavioral control.  [Recommended Frequency of Visits: ____] : Recommended frequency of visits: [unfilled] [Return in ____ week(s)] : Return in [unfilled] week(s)

## 2025-03-19 NOTE — REASON FOR VISIT
[Patient preference] : as per patient preference [Continuity of care] : to ensure continuity of care [Telehealth (audio & video) - Individual/Group] : This visit was provided via telehealth using real-time 2-way audio visual technology. [Other Location: e.g. Home (Enter Location, City,State)___] : The provider was located at [unfilled]. [Home] : The patient, [unfilled], was located at home, [unfilled], at the time of the visit. [Patient's space is appropriate for telehealth and maintains privacy/confidentiality.] : Patient's space is appropriate for telehealth and maintains privacy/confidentiality. [Participant(s) identity verified] : Participant(s) identity verified. [Verbal consent obtained from patient/other participant(s)] : Verbal consent for telehealth/telephonic services obtained from patient/other participant(s) [FreeTextEntry4] : 11:00 am [FreeTextEntry5] : 11:49 am [Patient] : Patient [FreeTextEntry1] : "I am isolated...I want to be able to be more social".

## 2025-03-28 NOTE — END OF VISIT
[Duration of Psychotherapy Visit (minutes spent in synchronous communication): ____] : Duration of Psychotherapy Visit (minutes spent in synchronous communication): [unfilled] [Individual Psychotherapy for 38-52 minutes] : Individual Psychotherapy for 38 - 52 minutes [Teletherapy Service Provided] : The services provided in this session were delivered via tele-therapy [Licensed Clinician] : Licensed Clinician [FreeTextEntry3] : Home [FreeTextEntry5] : [facility]

## 2025-03-28 NOTE — END OF VISIT
[Duration of Psychotherapy Visit (minutes spent in synchronous communication): ____] : Duration of Psychotherapy Visit (minutes spent in synchronous communication): [unfilled] [Individual Psychotherapy for 38-52 minutes] : Individual Psychotherapy for 38 - 52 minutes [Teletherapy Service Provided] : The services provided in this session were delivered via tele-therapy [Licensed Clinician] : Licensed Clinician [FreeTextEntry3] : Home [FreeTextEntry5] : [facility]

## 2025-03-28 NOTE — REASON FOR VISIT
[Patient preference] : as per patient preference [Continuity of care] : to ensure continuity of care [Telehealth (audio & video) - Individual/Group] : This visit was provided via telehealth using real-time 2-way audio visual technology. [Other Location: e.g. Home (Enter Location, City,State)___] : The provider was located at [unfilled]. [Home] : The patient, [unfilled], was located at home, [unfilled], at the time of the visit. [Patient's space is appropriate for telehealth and maintains privacy/confidentiality.] : Patient's space is appropriate for telehealth and maintains privacy/confidentiality. [Participant(s) identity verified] : Participant(s) identity verified. [Verbal consent obtained from patient/other participant(s)] : Verbal consent for telehealth/telephonic services obtained from patient/other participant(s) [Patient] : Patient [FreeTextEntry4] : 11:00 am [FreeTextEntry5] : 11:50 am [FreeTextEntry1] : "I am isolated...I want to be able to be more social".

## 2025-03-28 NOTE — PLAN
[Cognitive and/or Behavior Therapy] : Cognitive and/or Behavior Therapy  [Supportive Therapy] : Supportive Therapy [Recommended Frequency of Visits: ____] : Recommended frequency of visits: [unfilled] [Return in ____ week(s)] : Return in [unfilled] week(s) [de-identified] : Pt is a 73 year old white  female who lives by herself in an apartment. Pt reports that she met with her grandson, as planned, on Monday. She states that they did some sight seeing and had a good time. Pt talked about her grandson's issues. She states that he is studying at a [employer]. Pt states that she gets frustrated when she cannot do things she was able to do before. Pt states that she wants to be more independent. She reports that she has some issues with her Aide as her Aide is not receptive to changes or suggestions. Pt states that she will make appt with PCP to discuss physical therapy options. Pt states that she continues to do breathing exercises. Therapist provided supportive therapy and mindfulness CBT. Pt ended session in good emotional and behavioral control.

## 2025-03-28 NOTE — PLAN
[Cognitive and/or Behavior Therapy] : Cognitive and/or Behavior Therapy  [Supportive Therapy] : Supportive Therapy [Recommended Frequency of Visits: ____] : Recommended frequency of visits: [unfilled] [Return in ____ week(s)] : Return in [unfilled] week(s) [de-identified] : Pt is a 73 year old white  female who lives by herself in an apartment. Pt reports that she met with her grandson, as planned, on Monday. She states that they did some sight seeing and had a good time. Pt talked about her grandson's issues. She states that he is studying at a [employer]. Pt states that she gets frustrated when she cannot do things she was able to do before. Pt states that she wants to be more independent. She reports that she has some issues with her Aide as her Aide is not receptive to changes or suggestions. Pt states that she will make appt with PCP to discuss physical therapy options. Pt states that she continues to do breathing exercises. Therapist provided supportive therapy and mindfulness CBT. Pt ended session in good emotional and behavioral control.

## 2025-04-03 NOTE — END OF VISIT
[Duration of Psychotherapy Visit (minutes spent in synchronous communication): ____] : Duration of Psychotherapy Visit (minutes spent in synchronous communication): [unfilled] [Individual Psychotherapy for 38-52 minutes] : Individual Psychotherapy for 38 - 52 minutes [Teletherapy Service Provided] : The services provided in this session were delivered via tele-therapy [FreeTextEntry3] : Home [FreeTextEntry5] : [facility] [Licensed Clinician] : Licensed Clinician

## 2025-04-03 NOTE — PLAN
[Cognitive and/or Behavior Therapy] : Cognitive and/or Behavior Therapy  [Supportive Therapy] : Supportive Therapy [de-identified] : Pt is a 73 year old white  female who lives by herself in an apartment. Pt reports that she has been frustrated  Therapist provided supportive therapy and mindfulness CBT. Pt ended session in good emotional and behavioral control.  [Recommended Frequency of Visits: ____] : Recommended frequency of visits: [unfilled] [Return in ____ week(s)] : Return in [unfilled] week(s)

## 2025-04-03 NOTE — PLAN
[Cognitive and/or Behavior Therapy] : Cognitive and/or Behavior Therapy  [Supportive Therapy] : Supportive Therapy [de-identified] : Pt is a 73 year old white  female who lives by herself in an apartment. Pt reports that she has been frustrated  Therapist provided supportive therapy and mindfulness CBT. Pt ended session in good emotional and behavioral control.  [Recommended Frequency of Visits: ____] : Recommended frequency of visits: [unfilled] [Return in ____ week(s)] : Return in [unfilled] week(s)

## 2025-04-03 NOTE — REASON FOR VISIT
[Patient preference] : as per patient preference [Continuity of care] : to ensure continuity of care [Telehealth (audio & video) - Individual/Group] : This visit was provided via telehealth using real-time 2-way audio visual technology. [Other Location: e.g. Home (Enter Location, City,State)___] : The provider was located at [unfilled]. [Home] : The patient, [unfilled], was located at home, [unfilled], at the time of the visit. [Patient's space is appropriate for telehealth and maintains privacy/confidentiality.] : Patient's space is appropriate for telehealth and maintains privacy/confidentiality. [Participant(s) identity verified] : Participant(s) identity verified. [Verbal consent obtained from patient/other participant(s)] : Verbal consent for telehealth/telephonic services obtained from patient/other participant(s) [FreeTextEntry4] : 11:00 am [FreeTextEntry5] : 11:50 am [Patient] : Patient [FreeTextEntry1] : "I am isolated...I want to be able to be more social".

## 2025-04-03 NOTE — END OF VISIT
[Duration of Psychotherapy Visit (minutes spent in synchronous communication): ____] : Duration of Psychotherapy Visit (minutes spent in synchronous communication): [unfilled] [Individual Psychotherapy for 38-52 minutes] : Individual Psychotherapy for 38 - 52 minutes [Teletherapy Service Provided] : The services provided in this session were delivered via tele-therapy [FreeTextEntry3] : Home [FreeTextEntry5] : [facility] [Licensed Clinician] : Licensed Clinician

## 2025-04-10 NOTE — REASON FOR VISIT
[Patient preference] : as per patient preference [Continuity of care] : to ensure continuity of care [Telehealth (audio & video) - Individual/Group] : This visit was provided via telehealth using real-time 2-way audio visual technology. [Other Location: e.g. Home (Enter Location, City,State)___] : The provider was located at [unfilled]. [Home] : The patient, [unfilled], was located at home, [unfilled], at the time of the visit. [Patient's space is appropriate for telehealth and maintains privacy/confidentiality.] : Patient's space is appropriate for telehealth and maintains privacy/confidentiality. [Participant(s) identity verified] : Participant(s) identity verified. [Verbal consent obtained from patient/other participant(s)] : Verbal consent for telehealth/telephonic services obtained from patient/other participant(s) [FreeTextEntry4] : 11:03 am [FreeTextEntry5] : 11:48 am [Patient] : Patient [FreeTextEntry1] : "I am isolated...I want to be able to be more social".

## 2025-04-10 NOTE — END OF VISIT
[Duration of Psychotherapy Visit (minutes spent in synchronous communication): ____] : Duration of Psychotherapy Visit (minutes spent in synchronous communication): [unfilled] [Individual Psychotherapy for 38-52 minutes] : Individual Psychotherapy for 38 - 52 minutes [Teletherapy Service Provided] : The services provided in this session were delivered via tele-therapy [FreeTextEntry3] : Home [FreeTextEntry5] : [facility] [Licensed Clinician] : Licensed Clinician

## 2025-04-10 NOTE — PLAN
[Cognitive and/or Behavior Therapy] : Cognitive and/or Behavior Therapy  [Supportive Therapy] : Supportive Therapy [de-identified] : Pt is a 73 year old white  female who lives by herself in an apartment. Pt reports that when she was going to sign up for the virtual call today, an update in her computer came up. She states that she did not what to do and fell frustrated. She also stated that she "immediately blame myself". She stated that this is an "automatic behavior". She states that this is a relative new behavior. She stated that she used to be able to multi task and solve her problems without getting so frustrated. Therapist provided support, normalized and validated Pt's feelings. Pt stated that she has not contacted her PCP regarding rehab referral. Therapist provided supportive therapy and mindfulness CBT. Pt ended session in good emotional and behavioral control.  [Recommended Frequency of Visits: ____] : Recommended frequency of visits: [unfilled] [Return in ____ week(s)] : Return in [unfilled] week(s)

## 2025-04-17 NOTE — END OF VISIT
[Duration of Psychotherapy Visit (minutes spent in synchronous communication): ____] : Duration of Psychotherapy Visit (minutes spent in synchronous communication): [unfilled] [Individual Psychotherapy for 38-52 minutes] : Individual Psychotherapy for 38 - 52 minutes [Teletherapy Service Provided] : The services provided in this session were delivered via tele-therapy [FreeTextEntry3] : Home [FreeTextEntry5] : [facility] [Licensed Clinician] : Licensed Clinician

## 2025-04-17 NOTE — PLAN
[Cognitive and/or Behavior Therapy] : Cognitive and/or Behavior Therapy  [Supportive Therapy] : Supportive Therapy [de-identified] : Pt is a 73 year old white  female who lives by herself in an apartment. Pt reports her week has been "pretty good". She states that she "did a lot of things". She states that she has more things to do that she had put off for a long time. She states that she has not call her PCP to discuss rehab referral because she does not know what type of referral she wants to request, but plans to call him when she is ready. Pt states that she is starting to feel more like her old self. She states that she is talking to friends more often which she states enjoys. Therapist provided supportive therapy and mindfulness CBT. Pt ended session in good emotional and behavioral control.  [Recommended Frequency of Visits: ____] : Recommended frequency of visits: [unfilled] [Return in ____ week(s)] : Return in [unfilled] week(s)

## 2025-04-17 NOTE — REASON FOR VISIT
[Patient preference] : as per patient preference [Continuity of care] : to ensure continuity of care [Telehealth (audio & video) - Individual/Group] : This visit was provided via telehealth using real-time 2-way audio visual technology. [Other Location: e.g. Home (Enter Location, City,State)___] : The provider was located at [unfilled]. [Home] : The patient, [unfilled], was located at home, [unfilled], at the time of the visit. [Patient's space is appropriate for telehealth and maintains privacy/confidentiality.] : Patient's space is appropriate for telehealth and maintains privacy/confidentiality. [Participant(s) identity verified] : Participant(s) identity verified. [Verbal consent obtained from patient/other participant(s)] : Verbal consent for telehealth/telephonic services obtained from patient/other participant(s) [FreeTextEntry4] : 11:00 am [FreeTextEntry5] : 11:48 am [Patient] : Patient [FreeTextEntry1] : "I am isolated...I want to be able to be more social".

## 2025-04-24 NOTE — REASON FOR VISIT
[Patient preference] : as per patient preference [Continuity of care] : to ensure continuity of care [Telehealth (audio & video) - Individual/Group] : This visit was provided via telehealth using real-time 2-way audio visual technology. [Other Location: e.g. Home (Enter Location, City,State)___] : The provider was located at [unfilled]. [Home] : The patient, [unfilled], was located at home, [unfilled], at the time of the visit. [Patient's space is appropriate for telehealth and maintains privacy/confidentiality.] : Patient's space is appropriate for telehealth and maintains privacy/confidentiality. [Participant(s) identity verified] : Participant(s) identity verified. [Verbal consent obtained from patient/other participant(s)] : Verbal consent for telehealth/telephonic services obtained from patient/other participant(s) [Patient] : Patient [FreeTextEntry4] : 11:00 am [FreeTextEntry5] : 11:46 am [FreeTextEntry1] : "I am isolated...I want to be able to be more social".

## 2025-04-24 NOTE — END OF VISIT
[Duration of Psychotherapy Visit (minutes spent in synchronous communication): ____] : Duration of Psychotherapy Visit (minutes spent in synchronous communication): [unfilled] [Individual Psychotherapy for 38-52 minutes] : Individual Psychotherapy for 38 - 52 minutes [Teletherapy Service Provided] : The services provided in this session were delivered via tele-therapy [Licensed Clinician] : Licensed Clinician [FreeTextEntry3] : Home [FreeTextEntry5] : [facility]

## 2025-04-24 NOTE — PLAN
[Supportive Therapy] : Supportive Therapy [Recommended Frequency of Visits: ____] : Recommended frequency of visits: [unfilled] [Return in ____ week(s)] : Return in [unfilled] week(s) [de-identified] : Pt is a 73 year old white  female who lives by herself in an apartment. Pt reports that she got very anxious this morning when she called her  to ask a question. Pt states that she gets easily frustrated when she needs assistance. Pt talked about her childhood and the poor communication she had at home with her parents. Pt states that she feels like she raised herself as there was no guidance from her parents. Therapist provided supportive therapy and mindfulness CBT. Pt ended session in good emotional and behavioral control.

## 2025-05-02 NOTE — PLAN
[Supportive Therapy] : Supportive Therapy [Recommended Frequency of Visits: ____] : Recommended frequency of visits: [unfilled] [Return in ____ week(s)] : Return in [unfilled] week(s) [de-identified] : Pt is a 73 year old white  female who lives by herself in an apartment. Pt reports that she went out yesterday with a couple of friends. She also stated that she had a appt with Ophthalmologist as she had a Sty in her eye. Pt states that she was very tired and achy when she was back from her outing. Pt states that she is aware that she "catastrophizes everything". She states that she is concern about her ability to ambulate. Therapist provided support, normalized and validated Pt's feelings. She states that she is going to make appt with her PCP to discuss rehab. Pt reports that she has appt with Pulmonologist this month. Therapist provided supportive therapy and mindfulness CBT. Pt ended session in good emotional and behavioral control.

## 2025-05-02 NOTE — END OF VISIT
[Duration of Psychotherapy Visit (minutes spent in synchronous communication): ____] : Duration of Psychotherapy Visit (minutes spent in synchronous communication): [unfilled] [Individual Psychotherapy for 38-52 minutes] : Individual Psychotherapy for 38 - 52 minutes [Teletherapy Service Provided] : The services provided in this session were delivered via tele-therapy [Licensed Clinician] : Licensed Clinician [FreeTextEntry3] : Home [FreeTextEntry5] : [facility]

## 2025-05-02 NOTE — PLAN
[Supportive Therapy] : Supportive Therapy [Recommended Frequency of Visits: ____] : Recommended frequency of visits: [unfilled] [Return in ____ week(s)] : Return in [unfilled] week(s) [de-identified] : Pt is a 73 year old white  female who lives by herself in an apartment. Pt reports that she went out yesterday with a couple of friends. She also stated that she had a appt with Ophthalmologist as she had a Sty in her eye. Pt states that she was very tired and achy when she was back from her outing. Pt states that she is aware that she "catastrophizes everything". She states that she is concern about her ability to ambulate. Therapist provided support, normalized and validated Pt's feelings. She states that she is going to make appt with her PCP to discuss rehab. Pt reports that she has appt with Pulmonologist this month. Therapist provided supportive therapy and mindfulness CBT. Pt ended session in good emotional and behavioral control.

## 2025-05-02 NOTE — END OF VISIT
[Duration of Psychotherapy Visit (minutes spent in synchronous communication): ____] : Duration of Psychotherapy Visit (minutes spent in synchronous communication): [unfilled] [Individual Psychotherapy for 38-52 minutes] : Individual Psychotherapy for 38 - 52 minutes [Teletherapy Service Provided] : The services provided in this session were delivered via tele-therapy [Licensed Clinician] : Licensed Clinician [FreeTextEntry3] : Home [FreeTextEntry5] : [facility]

## 2025-05-08 NOTE — END OF VISIT
[Duration of Psychotherapy Visit (minutes spent in synchronous communication): ____] : Duration of Psychotherapy Visit (minutes spent in synchronous communication): [unfilled] [Individual Psychotherapy for 38-52 minutes] : Individual Psychotherapy for 38 - 52 minutes [Teletherapy Service Provided] : The services provided in this session were delivered via tele-therapy [Licensed Clinician] : Licensed Clinician [FreeTextEntry3] : Home [FreeTextEntry5] : [facility]

## 2025-05-08 NOTE — REASON FOR VISIT
[Patient preference] : as per patient preference [Continuity of care] : to ensure continuity of care [Telehealth (audio & video) - Individual/Group] : This visit was provided via telehealth using real-time 2-way audio visual technology. [Other Location: e.g. Home (Enter Location, City,State)___] : The provider was located at [unfilled]. [Home] : The patient, [unfilled], was located at home, [unfilled], at the time of the visit. [Patient's space is appropriate for telehealth and maintains privacy/confidentiality.] : Patient's space is appropriate for telehealth and maintains privacy/confidentiality. [Participant(s) identity verified] : Participant(s) identity verified. [Verbal consent obtained from patient/other participant(s)] : Verbal consent for telehealth/telephonic services obtained from patient/other participant(s) [Patient] : Patient [FreeTextEntry4] : 11:00 am [FreeTextEntry5] : 11:47 am [FreeTextEntry1] : "I am isolated...I want to be able to be more social".

## 2025-05-08 NOTE — PLAN
[Supportive Therapy] : Supportive Therapy [Recommended Frequency of Visits: ____] : Recommended frequency of visits: [unfilled] [Return in ____ week(s)] : Return in [unfilled] week(s) [de-identified] : Pt is a 73 year old white  female who lives by herself in an apartment. Pt appears to be doing better. She talks about medical conditions and medical care. Pt talked about her goals and plans. Therapist provided supportive therapy and mindfulness CBT. Pt ended session in good emotional and behavioral control.

## 2025-05-15 NOTE — END OF VISIT
[Duration of Psychotherapy Visit (minutes spent in synchronous communication): ____] : Duration of Psychotherapy Visit (minutes spent in synchronous communication): [unfilled] [Individual Psychotherapy for 38-52 minutes] : Individual Psychotherapy for 38 - 52 minutes [Teletherapy Service Provided] : The services provided in this session were delivered via tele-therapy [FreeTextEntry3] : Home [FreeTextEntry5] : [facility] [Licensed Clinician] : Licensed Clinician

## 2025-05-15 NOTE — PLAN
[Supportive Therapy] : Supportive Therapy [de-identified] : Pt is a 73 year old white  female who lives by herself in an apartment. Pt reports that she had lunch with her grandson last week and she enjoyed the outing. Pt talked about goals she has for herself and how she plans to do it. Pt talked about family dynamics. Therapist provided supportive therapy and mindfulness CBT. Pt ended session in good emotional and behavioral control.  [Recommended Frequency of Visits: ____] : Recommended frequency of visits: [unfilled] [Return in ____ week(s)] : Return in [unfilled] week(s)

## 2025-05-21 NOTE — HISTORY OF PRESENT ILLNESS
[FreeTextEntry1] : May 21, 2025 Patient returns for followu p, overall feeling well In March after the infusion, had fungal skin infection and stye on eyelid x 2  Major concern remains feeling of muscle weakness and pain, feels activities are limited due to fatigue and muscle aches following activities tylenol 1000 bid for the aches and pains, helps but does not help entirely No formal PT at this time, continues home exercises but probably not as often as feels should, will try to increase Was seen by nephrology, labs ordered  rituximab every 6 months, next dose 9/15/2025, tolerating well  February 19, 2025 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 Patient overall feeling well, s/p rituximab in 9/2024, no side effects noted Since last visit, started low dose lexapro with benefit to date  Main concern remains pain symptoms which feels limits activities, less exercise due to cold weather Would like to see physiatrist again, patient will discuss with PMD Patient is scheduled for maintenance rituximab dose in March, has follow up with renal next week  November 7, 2024 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 Patient overall feeling well, s/p rituximab in 9/2024, no side effects  Was evaluated with nephologist last month, urine studies reviewed with patient. Reviewed medications with patient. Discontinued Cymbalta, methocarbamol Patient with increase depressive symptoms, following [facility] therapist, but discussed with PMD regarding referral to mental health as may be interested in starting medication regimen.  Patient not currently attending PT, feels may be more motivated when depressive symptoms improve Walking for exercise  Will receive influenza vaccination during today's visit  No chest pain, no shortness of breath, no cough, no peripheral edema, no rashes Feels some residual left hand tremor (much improved since stopping prednisone but not fully resolved), discussed follow up with neurology as well.  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 Patient overall doing well in terms of vasculitis symptoms Major concern related to pain and discomfort in muscles throughout the body takes acetaminophen (1000mg, once a day) alleviates symptoms  The patient is currently experiencing pain in the neck for which she received nerve blockers and steroid injections with minimal benefit, feels PT makes the symptoms worse  Feels leg pain with ambulation, walks everyday but minimal distance No peripheral edema, no chest pain or shortness of breath, no rashes or fevers Patient recently start zetia by cardiologist, will repeat lipid panel today as well Patient is due for next rituximab 9/2024, still pending PA Blood pressure well controlled today  May 9, 2024 Patient returns for follow up Patient feeling better but still not at baseline Patient has noted new DIP changes of the second fingers bilaterally which are new Rash around the neck line Sometimes has pruritic skin on the left eyelid Seeing Dr. Dias for injections for the nneck pain, minimal benefit to date, has appointment for follow up pending Started on cymbalta 20 mg qday, no side effects but minimal benefit to date Still with difficulty walking , walks about 4 blocks, trying to walk more, better than previously. Exercises at home as well Will have follow up with GI as well for results of stool testing Feels some tremor remains after the steroids completed, but improving over tme  February 1, 2024 Patient returns for follow up for ANCA positive vasculitis At this time, major concern related to neck and low back pain Patient reports pain in right side of neck, back and hips Patient following with pain management as well as orthopedist Reports change in hair texture since stopping steroids Patient is currently taking Crestor, planning to switch to Lipitor Takes amlodipine and carvedilol  Treated with Rituximab, next treatment March 14 Does not feel relief with Tylenol in terms of pain Has not tried CBD oil in the past, will discuss with Dr. Dias No longer doing formal physical therapy at this time, but does the PT exercises at home Following with Dr. Dias in physical medicine, not yet feeling benefit, next appointment in two weeks Reviewed recent blood work with patient, kidney function improved, proteinuria decreased to 0.6 Discussed long term treatment options including rituximab maintenance   November 1, 2023 Patient returns for follow up for ANCA positive vasculitis.   Patient evaluated by orthopedist earlier today, recommended PT Reviewed MRI results  Reviewed recent lab results Patient continues: Medications: amlodipine 5 carvedilol 6.25 bid crestor 20 lorazepam 0.5 mg as needed  stopped prednisone July 2023 mupricin for the nose metronidazole for rosacea rituximab 9/14/2023, next dose in March 2023 Patient feels unsteady due to difficulty in gait Was recently started on celexa, unclear benefit to date DEXA completed, results reviewed t score -2.0 femoral neck  June 1, 2023 Patient returns for follow up Patient with left sided back pain following fall when wooden arm of chair fell onto back Was seen in the emergency room last week, had xrays completed, no rib fracture noted Has difficulty breathing secondary to the pain, although feels may be slowly improving. ACC: [id number] EXAM: XR RIBS W PA CHEST 3 VIEWS LT ORDERED BY: PETEY BURRELL  PROCEDURE DATE: 05/27/2023    INTERPRETATION: AP chest and 3 views of left ribs.  CLINICAL INDICATION: Left chest pain after fall.  IMPRESSION: There is patchy left mid lung opacification compatible with atelectasis or pneumonia. There is a small left pleural effusion. Right lung is clear. The heart is normal in size.  --- End of Report --- Patient with decrease in pulse ox in the office with ambulation, 94% at rest, associated with shortness of breath. Patient to have CT chest today, discussed referring to emergency room but patient would like to to have CT completed

## 2025-05-21 NOTE — ASSESSMENT
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 Patient tapered off prednisone (July 2023) and discontinued cellcept as was unable to tolerate as well. Patient doing well on rituximab maintenance every six months, last dose March 2025, ANCA now negative. Main concern for patient at this time related to musculoskeletal concerns, accompanied by bilateral leg pain while walking, for which the patient is taking Tylenol (1000mg, now twicer per day) to alleviate symptoms, not currently in PT, continues home exercises but will try to increase frequency of exercising. patient will discuss with PMD. Patient started low dose lexapro with good response. Will update labs today including labs from nephrologist.  Blood pressure well controlled. Osteopenia, repeat DEXA 11/2025 as well. Patient will follow up in 3 months prior to next infusion, or sooner as needed.

## 2025-05-21 NOTE — REVIEW OF SYSTEMS
[Arthralgias] : arthralgias [Limb Weakness] : limb weakness [Difficulty Walking] : difficulty walking [Depression] : depression [Negative] : Heme/Lymph [Limb Swelling] : no limb swelling

## 2025-05-21 NOTE — PHYSICAL EXAM
[General Appearance - Alert] : alert [General Appearance - In No Acute Distress] : in no acute distress [General Appearance - Well Nourished] : well nourished [General Appearance - Well-Appearing] : healthy appearing [Sclera] : the sclera and conjunctiva were normal [Examination Of The Oral Cavity] : the lips and gums were normal [Respiration, Rhythm And Depth] : normal respiratory rhythm and effort [Exaggerated Use Of Accessory Muscles For Inspiration] : no accessory muscle use [Auscultation Breath Sounds / Voice Sounds] : lungs were clear to auscultation bilaterally [Heart Rate And Rhythm] : heart rate was normal and rhythm regular [Heart Sounds] : normal S1 and S2 [Murmurs] : no murmurs [No Spinal Tenderness] : no spinal tenderness [Abnormal Walk] : normal gait [Nail Clubbing] : no clubbing  or cyanosis of the fingernails [Musculoskeletal - Swelling] : no joint swelling seen [Motor Tone] : muscle strength and tone were normal [] : no rash [Oriented To Time, Place, And Person] : oriented to person, place, and time [Impaired Insight] : insight and judgment were intact [Affect] : the affect was normal [Mood] : the mood was normal [FreeTextEntry1] : No active synovitis of the upper and lower extremities bilaterally.

## 2025-05-21 NOTE — DATA REVIEWED
[FreeTextEntry1] : Coronary Arteries (segment number):  LM (5): Normal.  LAD: Prox (6): Normal. Mid (7): Normal. Distal (8): Normal. D1 (9): High take off, normal. D2 (10): Minimal stenosis due to calcified and noncalcified plaque.  LCX: Prox (11): Normal. OM1(12): Normal. Mid (13): Normal. OM2 (14): Normal.  RCA: Prox (1): Normal. Mid (2): Motion, minimal stenosis Distal (3): Normal. RPDA (4): Normal. RPL (16): Normal.        {% area stenosis: Normal = 0%; Minimal = 1 to 29%; Mild = 30 to 49%; Moderate= 50 to 69%; Severe= 70 to 90%; Subtotal > or = 91%            } Lesion type: Calcified; Noncalcified; Predominantly Calcified; Predominantly Noncalcified; With Remodeling  C: Additional Cardiac Findings Aortic Dissection: None in the visualized segments of the thoracic aorta. Myocardial structural abnormality: LV: N LA: N RV: N RA: N Atrial septum: Probably normal Ventricular septum: N Pericardium: N Pericardial effusion: N  Non Cardiac Findings:  COMPARISON: CT chest 12/30/2021.  Linear atelectasis of the lingula. Tiny left Bochdalek hernia. Bilateral lung cysts. Adjacent to the cyst in the left lower lobe is a linear opacity that is unchanged. The remainder of the imaged lungs are clear. The included airways are unremarkable. The visualized mediastinum is normal. The included aorta is ectatic. Aortic calcifications. The descending thoracic aorta is tortuous. The upper abdomen is unremarkable.   IMPRESSION:  Cardiac: 1. The calcium score is mild at 12 Agatston units, which is at the 49 percentile, adjusted for age, gender and race. 2. Non-obstructive coronary disease  Non-cardiac: 1. The imaged lung cysts are unchanged. 2. Linear opacity in the left lower lobe adjacent to several lung cyst is unchanged.   Michaelle Marino M.D., Attending Cardiologist Gill Mendez M.D., Attending Radiologist  --- End of Report ---  ACC: [id number] EXAM: US DPLX CAROTIDS COMPL BI  PROCEDURE DATE: 08/19/2022    INTERPRETATION: CLINICAL INFORMATION: Syncope.  COMPARISON: None available.  TECHNIQUE: Grayscale, color and spectral Doppler examination of both carotid arteries was performed.  FINDINGS:  Small plaque in the right carotid bulb. No visible luminal carotid stenosis in either side. No significant plaque in the left carotid artery.  Peak systolic velocities are as follows:  RIGHT: PROX CCA = 100 cm/s DIST CCA = 86 cm/s PROX ICA = 70 cm/s DIST ICA = 134 cm/s ECA = 90 cm/s  LEFT: PROX CCA = 117 cm/s DIST CCA = 61 cm/s PROX ICA = 51 cm/s DIST ICA = 91 cm/s ECA = 76 cm/s  Antegrade flow is noted within both vertebral arteries.  IMPRESSION: Borderline elevated velocities in the distal right ICA without visible luminal narrowing. This finding could be due to hyperdynamic flow or artifact. No significant carotid stenosis is suspected.   MR Shoulder Joint No Cont, Left             Final  No Documents Attached       EXAM: [id number] - MR SHOULDER LT  - ORDERED BY: THUAN RHODES   PROCEDURE DATE:  07/29/2022    INTERPRETATION:  CLINICAL HISTORY: 70-year-old with left shoulder pain.      FINDINGS: MRI of the left shoulder was performed in the axial, coronal and sagittal planes with proton density and fluid sensitive weighting with and without fat suppression. There is no prior study available for comparison.  Evaluation of the shoulder demonstrates tendinosis of the supraspinatus and infraspinatus tendons. Low-grade partial-thickness interstitial tearing of the subscapularis tendon (image 12, axial). The extra-articular biceps tendon is normally located within the bicipital groove. There is mild tendinosis of the intra-articular biceps tendon. There is severe degenerative arthropathy of acromioclavicular joint. There is moderate volume of fluid within the subacromial/subdeltoid bursa (image 9, coronal).  There is no significant joint effusion. Evaluation of the labrum demonstrates a the anteroinferior, posterior inferior and superior labrum are intact. The contour of the humeral head and glenoid are preserved. No significant joint effusion. There is mild irregularity of the anterior/inferior humeral head (image 14, axial). No discrete loss of the glenoid surface. Negative for axillary adenopathy. The inferior glenohumeral ligament and coracohumeral ligament are not thickened. There is no abnormal signal within the rotator interval. No fracture. No dislocation. No muscle and no bone marrow edema.      IMPRESSION:  Moderate tendinosis of the supraspinatus and infraspinatus tendons.  Low-grade interstitial tearing of the subscapularis tendon.  Subacromial/subdeltoid bursitis.       Dr. Carol Yoon can be reached at [email or web address] with questions regarding this report.  --- End of Report ---     US Joint Nonvasc Extremity Limited, Left             Final  No Documents Attached       EXAM: [id number] - US JOINT NONVASC EXT LTD LT  - ORDERED BY: THUAN RHODES   PROCEDURE DATE:  07/13/2022    INTERPRETATION:  History: Left shoulder pain.  Technique: Ultrasound of the left shoulder was performed  Findings:  There is suspicion for partial tear involving the anterior supraspinatus and upper subscapularis insertions. The rotator cuff tendons are otherwise unremarkable. The supraspinatus and infraspinatus muscles show no significant fat atrophy.  The long head of the biceps tendon is intact and unremarkable.  The acromioclavicular joint is unremarkable.  The posterior aspect of the glenohumeral joint is unremarkable.  Impression:  There is suspicion for partial tear involving the anterior supraspinatus and upper subscapularis insertions. Further evaluation with dedicated MRI of the shoulder may be of utility for more definitive characterization as clinically indicated.  --- End of Report ---       MAC CABRERA MD; Attending Radiologist This document has been electronically signed. Jul 13 2022  5:22PM      Ordered by: THUAN RHODES       Collected/Examined: [id number] 10:57AM        Verified by: THUAN RHODES [id number] 06:36PM         Result Communication: No patient communication needed at this time; Stage: Final         Performed at: [facility]       Resulted: [id number] 05:19PM       Last Updated: [id number] 06:36PM       Accession: [id number]           CAROL YOON MD; Attending Radiologist This document has been electronically signed. Jul 29 2022  2:57PM      Ordered by: THUAN RHODES       Collected/Examined: [id number] 02:32PM        Verified by: THUAN RHODES [id number] 04:11PM         Result Communication: No patient communication needed at this time; Stage: Final         Performed at: [facility]       Resulted: [id number] 02:22PM       Last Updated: 29Jul2022 04:11PM       Accession: [id number]           MR Hip No Cont, Left             Final  No Documents Attached       EXAM: [id number] - MR HIP LT  - ORDERED BY: THUAN RHODES   PROCEDURE DATE:  07/13/2022    INTERPRETATION:  History: Left hip pain  Technique: Magnetic resonance imaging of the left hip was performed without intravenous contrast according to standard protocol.  Comparison: None available  Findings:  There is mild diffuse tearing/degeneration of the acetabular labrum. There is mild fissuring of cartilage and subchondral signal abnormality involving the anterosuperior acetabulum. There is no joint effusion. There is high-grade partial tearing of the gluteus minimus enthesis and the lateral half of the gluteus medius insertion..  There is trace trochanteric bursitis. The fat planes surrounding the sciatic nerve are preserved. There is moderate proximal hamstring tendinosis.  There is no fracture or osteonecrosis.  There is no evidence of stress reaction.  The intrapelvic organs are within normal limits.  Impression:  Minimal degenerative changes of the hip.  Partial tearing of the gluteus medius and minimus insertions and associated mild trochanteric bursitis.  Mild diffuse degeneration of the acetabular labrum.  --- End of Report ---       MAC MATTHEW MBA-MOISÉS YANES; Attending Radiologist This document has been electronically signed. Jul 13 2022  2:50PM      Ordered by: THUAN RHODES       Collected/Examined: [id number] 12:59PM        Verified by: THUAN RHODES [id number] 06:35PM         Result Communication: No patient communication needed at this time; Stage: Final         Performed at: [facility]       Resulted: [id number] 02:41PM       Last Updated: 18Jul2022 06:35PM       Accession: [id number]          CT Chest No Cont             Final  No Documents Attached       EXAM:  CT CHEST  PROCEDURE DATE:  06/18/2021     INTERPRETATION:  CT SCAN OF CHEST  History: Follow-up of 1.5 cm left lower lobe nodule.  Technique: CT scan of chest performed from lung apices through lung bases. Axial, coronal, and sagittal multiplanar reformatted images were produced. Thin section axial images and axial MIPS were also produced. Intravenous contrast material was not administered, as ordered.  Comparison: CT 12/20/2020  Findings:  Lungs and large airways: Previously seen 1.5 x 1.1 cm nodule in the posterior basal segment left lower lobe is decreased in size with somewhat discoid appearance, now measuring 1.4 x 0.6 cm. Near complete resolution of previously seen left upper and lower lobe consolidation, now demonstrating mild scarring, minimal traction bronchiectasis, and reticulations. Areas of reticulation, parenchymal distortion and traction bronchiectasis in the right lower lobe and right upper lobe, with interval decrease in extent of associated groundglass opacity. Biapical pleural-parenchymal scarring. Unchanged subcentimeter perifissural nodule along the major fissure.  Pleura:  No pleural effusion.  Mediastinum and hilar regions: No thoracic lymphadenopathy.  Heart and pericardium:  Heart size is normal. No pericardial effusion.  Vessels:  Normal.  Chest wall and lower neck:  Normal.  Upper abdomen: Small hiatal hernia..  Bones: Degenerative changes in the spine..   Impression: 1.  Interval decrease in size of the left lower lobe nodular lesion, now appears to be focal scarring/atelectasis. 2.  Interval near complete resolution of left lung pneumonia. Bilateral multifocal areas of reticulation/scarring reflecting sequela of prior pneumonia.         JAVED HINSON MD; Attending Radiologist This document has been electronically signed. Jun 22 2021 12:46PM      Ordered by: ROLAND ANDRADE       Collected/Examined: 18Jun2021 04:50PM        Verified by: ROLAND ANDRADE 28Jun2021 05:42PM         Result Communication: No patient communication needed at this time; Stage: Final         Performed at: [facility]       Resulted: 22Jun2021 12:34PM       Last Updated: 28Jun2021 05:42PM       Accession: [id number]           DEXA Bone Density Axial with Vertebral Fracture Assessment             Final  No Documents Attached   Result Annotated 03Jan2022 09:15AM by NAIDA OLSON    appt pending to discuss will send to pmr and suggest ca and vit d       EXAM:  XR BONE DENS AXIAL W VERT FX  PROCEDURE DATE:  12/30/2021     INTERPRETATION:  BONE DENSITY STUDY OF THE LUMBAR SPINE AND LEFT HIP VERTEBRAL FRACTURE ANALYSIS  Clinical History: 70 year old postmenopausal female with chronic steroid use.  Baseline examination.  Procedure: Measurements of bone density were made in the lumbar spine and in the left hip using dual x-ray absorptiometry (DEXA).  Vertebral fracture analysis was also performed on a lateral image of the spine.  Results:  Left Hip (Total)  BMD       0.816 g/sq. cm. T-Score  -1.0 SD from the mean Z-Score  0.5 SD from the mean  Femoral neck BMD       0.744 g/sq. cm. T-Score  -0.9 SD from the mean Z-Score  0.9 SD from the mean  FRAX WHO fracture risk assessment tool  10 year fracture risk Major osteoporotic fracture       8.4% Hip fracture                               0.9%   Spine  BMD       0.919 g/sq. cm. T-Score  -1.2 SD from the mean Z-Score  1.0 SD from the mean  Vertebral fracture analysis shows no evidence of compression fracture   Impression:  Osteopenia of the lumbar spine. No evidence of compression fracture.   Legend: BMD = Bone mineral density T-Score = Variance from a young adult population matched for gender and ethnicity Z-Score = Variance from a population matched for age, gender and ethnicity ([employer] [facility])  --- End of Report ---       RUDI FARLEY MD; Attending Radiologist This document has been electronically signed. Dec 30 2021  1:45PM      Ordered by: NAIDA OLSON       Collected/Examined: [id number] 11:54AM        Verified by: NAIDA OLSON 03Jan2022 09:15AM         Result Communication: No patient communication needed at this time; Stage: Final         Performed at: [facility]       Resulted: [id number] 01:43PM       Last Updated: 03Jan2022 09:15AM       Accession: [id number]           CT Chest No Cont             Final  No Documents Attached       EXAM:  CT CHEST  PROCEDURE DATE:  12/30/2021     INTERPRETATION:  CT SCAN OF CHEST  History: Follow-up of left lung nodule.  Technique: CT scan of chest performed from lung apices through lung bases. Axial, coronal, and sagittal multiplanar reformatted images were produced. Thin section axial images and axial MIPS were also produced. Intravenous contrast material was not administered, as ordered.  Comparison: Comparison made with most recent chest CT from 6/18/2021 and with additional prior imaging studies dating back to 3/17/2017.  Findings:  Lungs and large airways: There is currently only a linear opacity remaining at the site of the previously biopsied solid nodule in the posterior basal segment of the left lower lobe. A few cystic spaces again surround this linear opacity, and may represent bronchiectasis or bullae. Mild biapical scarring. Additional areas of scarring present in both lungs.  Pleura:  No pleural effusion.  Mediastinum and hilar regions: No thoracic lymphadenopathy.  Heart and pericardium:  Heart size is normal. No pericardial effusion.  Vessels:  Mild coronary artery calcification. Mild calcified plaque aorta.  Chest wall and lower neck:  Small fat-containing Bochdalek hernia again present on the left.  Upper abdomen: Normal.  Bones: Mild pectus excavatum, with Jeremy index of 3.1. Mild dextroscoliosis.   Impression: 1. Since 6/18/2021, there is now only linear scarring remaining in the left lower lobe at the site of a previously biopsied nodule.  2. Additional areas of scarring in both lungs.  --- End of Report ---       ALEX NUNES MD; Attending Radiologist This document has been electronically signed. Shai  3 2022 10:47AM      Ordered by: ROLAND ANDRADE       Collected/Examined: [id number] 11:54AM        Verification Required       Stage: Final         Performed at: [facility]       Resulted: 03Jan2022 10:06AM       Last Updated: 03Jan2022 10:51AM       Accession: [id number]

## 2025-05-23 NOTE — REASON FOR VISIT
[Patient preference] : as per patient preference [Continuity of care] : to ensure continuity of care [Telehealth (audio & video) - Individual/Group] : This visit was provided via telehealth using real-time 2-way audio visual technology. [Other Location: e.g. Home (Enter Location, City,State)___] : The provider was located at [unfilled]. [Home] : The patient, [unfilled], was located at home, [unfilled], at the time of the visit. [Patient's space is appropriate for telehealth and maintains privacy/confidentiality.] : Patient's space is appropriate for telehealth and maintains privacy/confidentiality. [Participant(s) identity verified] : Participant(s) identity verified. [Verbal consent obtained from patient/other participant(s)] : Verbal consent for telehealth/telephonic services obtained from patient/other participant(s) [Patient] : Patient [FreeTextEntry4] : 11:00 am [FreeTextEntry5] : 11:45 am [FreeTextEntry1] : "I am isolated...I want to be able to be more social".

## 2025-05-23 NOTE — END OF VISIT
[Duration of Psychotherapy Visit (minutes spent in synchronous communication): ____] : Duration of Psychotherapy Visit (minutes spent in synchronous communication): [unfilled] [Individual Psychotherapy for 38-52 minutes] : Individual Psychotherapy for 38 - 52 minutes [Teletherapy Service Provided] : The services provided in this session were delivered via tele-therapy [Licensed Clinician] : Licensed Clinician [FreeTextEntry3] : Home [FreeTextEntry5] : [facility]

## 2025-05-23 NOTE — PLAN
[Supportive Therapy] : Supportive Therapy [Recommended Frequency of Visits: ____] : Recommended frequency of visits: [unfilled] [Return in ____ week(s)] : Return in [unfilled] week(s) [de-identified] : Pt is a 73 year old white  female who lives by herself in an apartment. Pt reports that she decided to write a list of goals she wants to accomplish and she is working on it. Pt talked about her life experiences. Therapist provided supportive therapy and mindfulness CBT. Pt ended session in good emotional and behavioral control.

## 2025-05-29 NOTE — PLAN
[Supportive Therapy] : Supportive Therapy [de-identified] : Pt is a 73 year old white  female who lives by herself in an apartment. Pt reports that she is working on her goals and actually accomplished one goal in her list.  Pt reports that she feels achy and tired which could be do to the rainy and damp weather. Therapist provided supportive therapy and mindfulness CBT. Pt ended session in good emotional and behavioral control.  [Recommended Frequency of Visits: ____] : Recommended frequency of visits: [unfilled] [Return in ____ week(s)] : Return in [unfilled] week(s)

## 2025-05-29 NOTE — END OF VISIT
[Duration of Psychotherapy Visit (minutes spent in synchronous communication): ____] : Duration of Psychotherapy Visit (minutes spent in synchronous communication): [unfilled] [Individual Psychotherapy for 38-52 minutes] : Individual Psychotherapy for 38 - 52 minutes [Teletherapy Service Provided] : The services provided in this session were delivered via tele-therapy [FreeTextEntry3] : Home [FreeTextEntry5] : [facility] [Licensed Clinician] : Licensed Clinician

## 2025-05-29 NOTE — REASON FOR VISIT
[Patient preference] : as per patient preference [Continuity of care] : to ensure continuity of care [Telehealth (audio & video) - Individual/Group] : This visit was provided via telehealth using real-time 2-way audio visual technology. [Other Location: e.g. Home (Enter Location, City,State)___] : The provider was located at [unfilled]. [Home] : The patient, [unfilled], was located at home, [unfilled], at the time of the visit. [Patient's space is appropriate for telehealth and maintains privacy/confidentiality.] : Patient's space is appropriate for telehealth and maintains privacy/confidentiality. [Participant(s) identity verified] : Participant(s) identity verified. [Verbal consent obtained from patient/other participant(s)] : Verbal consent for telehealth/telephonic services obtained from patient/other participant(s) [FreeTextEntry4] : 11:00 am [FreeTextEntry5] : 11:47 am [Patient] : Patient [FreeTextEntry1] : "I am isolated...I want to be able to be more social".

## 2025-06-03 NOTE — PHYSICAL EXAM
[General Appearance - Alert] : alert [General Appearance - In No Acute Distress] : in no acute distress [Sclera] : the sclera and conjunctiva were normal [PERRL With Normal Accommodation] : pupils were equal in size, round, and reactive to light [Extraocular Movements] : extraocular movements were intact [Outer Ear] : the ears and nose were normal in appearance [Oropharynx] : the oropharynx was normal [Neck Appearance] : the appearance of the neck was normal [Neck Cervical Mass (___cm)] : no neck mass was observed [Jugular Venous Distention Increased] : there was no jugular-venous distention [Respiration, Rhythm And Depth] : normal respiratory rhythm and effort [Exaggerated Use Of Accessory Muscles For Inspiration] : no accessory muscle use [Auscultation Breath Sounds / Voice Sounds] : lungs were clear to auscultation bilaterally [Heart Rate And Rhythm] : heart rate was normal and rhythm regular [Heart Sounds] : normal S1 and S2 [Heart Sounds Gallop] : no gallops [Murmurs] : no murmurs [Heart Sounds Pericardial Friction Rub] : no pericardial rub [Edema] : there was no peripheral edema [Veins - Varicosity Changes] : there were no varicosital changes [Bowel Sounds] : normal bowel sounds [Abdomen Soft] : soft [Abdomen Tenderness] : non-tender [No CVA Tenderness] : no ~M costovertebral angle tenderness [No Spinal Tenderness] : no spinal tenderness [Abnormal Walk] : normal gait [Involuntary Movements] : no involuntary movements were seen [Skin Color & Pigmentation] : normal skin color and pigmentation [Skin Turgor] : normal skin turgor [] : no rash [Deep Tendon Reflexes (DTR)] : deep tendon reflexes were 2+ and symmetric [Sensation] : the sensory exam was normal to light touch and pinprick [No Focal Deficits] : no focal deficits [FreeTextEntry1] : Anxious affect/mood, but improved

## 2025-06-03 NOTE — ASSESSMENT
[FreeTextEntry1] : Patient is a 72 yo F with PR3+ ANCA vasculitis diagnosed 2020, HTN, recent renal involvement now on rituximab  PR3+ ANCA vasculitis affecting kidney, joints - reviewed lab results in detail, at goal Continue rituximab, off steroids Has now completed two years, will continue as still has proteinuria  HTN - continue amlodipine and carvedilol for now - at goal, but may restart losartan if still with proteinuria at next check and moderately elevated  Gait abnormality - following with neuro, pain management, physical therapy for bursitis and tendon tear  Depression - discussed psychiatry, has her own. Likely combination of underlying anxiety and adjustment disorder  RTC in 3-4 months with labs prior.

## 2025-06-03 NOTE — HISTORY OF PRESENT ILLNESS
[FreeTextEntry1] : Patient is a 74 yo F with PR3+ ANCA vasculitis diagnosed 2020, HTN, recent renal involvement now on rituximab presenting for followup  Now with psychitry, on lexapro with good effect ANCA levels excellent, no worsening proteinuria, will recheck kidney function tests prior to next visit Discussed diet and exercise in detail - still fatigue and muscle aches likely from debility, discussed PT again

## 2025-06-09 NOTE — END OF VISIT
[Duration of Psychotherapy Visit (minutes spent in synchronous communication): ____] : Duration of Psychotherapy Visit (minutes spent in synchronous communication): [unfilled] [Individual Psychotherapy for 38-52 minutes] : Individual Psychotherapy for 38 - 52 minutes [Teletherapy Service Provided] : The services provided in this session were delivered via tele-therapy [Licensed Clinician] : Licensed Clinician [FreeTextEntry3] : Home [FreeTextEntry5] : [facility]

## 2025-06-09 NOTE — PLAN
[Supportive Therapy] : Supportive Therapy [Recommended Frequency of Visits: ____] : Recommended frequency of visits: [unfilled] [Return in ____ week(s)] : Return in [unfilled] week(s) [de-identified] : Pt is a 73 year old white  female who lives by herself in an apartment. Pt talked about medical appts she has to schedule. Pt states that she is exercising more. Pt reports that she has to request building staff to activate her air conditioning. Pt reports that she is concern about the state of the [facility]. Pt reports that her grandson will visit her today. Therapist provided supportive therapy and mindfulness CBT. Pt ended session in good emotional and behavioral control.

## 2025-06-09 NOTE — REASON FOR VISIT
[Patient preference] : as per patient preference [Continuity of care] : to ensure continuity of care [Other Location: e.g. Home (Enter Location, City,State)___] : The provider was located at [unfilled]. [Telehealth (audio & video) - Individual/Group] : This visit was provided via telehealth using real-time 2-way audio visual technology. [Patient's space is appropriate for telehealth and maintains privacy/confidentiality.] : Patient's space is appropriate for telehealth and maintains privacy/confidentiality. [Home] : The patient, [unfilled], was located at home, [unfilled], at the time of the visit. [Participant(s) identity verified] : Participant(s) identity verified. [Verbal consent obtained from patient/other participant(s)] : Verbal consent for telehealth/telephonic services obtained from patient/other participant(s) [Patient] : Patient [FreeTextEntry4] : 10:00 am [FreeTextEntry1] : "I am isolated...I want to be able to be more social". [FreeTextEntry5] : 10:48 am

## 2025-06-19 NOTE — REASON FOR VISIT
[Patient preference] : as per patient preference [Continuity of care] : to ensure continuity of care [Telehealth (audio & video) - Individual/Group] : This visit was provided via telehealth using real-time 2-way audio visual technology. [Other Location: e.g. Home (Enter Location, City,State)___] : The provider was located at [unfilled]. [Home] : The patient, [unfilled], was located at home, [unfilled], at the time of the visit. [Patient's space is appropriate for telehealth and maintains privacy/confidentiality.] : Patient's space is appropriate for telehealth and maintains privacy/confidentiality. [Participant(s) identity verified] : Participant(s) identity verified. [Verbal consent obtained from patient/other participant(s)] : Verbal consent for telehealth/telephonic services obtained from patient/other participant(s) [FreeTextEntry4] : 11:00 am [FreeTextEntry5] : 10:47 am [Patient] : Patient [FreeTextEntry1] : "I am isolated...I want to be able to be more social".

## 2025-06-19 NOTE — PLAN
[Supportive Therapy] : Supportive Therapy [de-identified] : Pt is a 73 year old white  female who lives by herself in an apartment. Pt continues to report frustration with the state of the [facility]. Pt reports that she made appt with Dermatologist on 6/2025, PCP 7/2025 and Lid Specialist 8/2025. Pt reports that her air conditioning was fixed. Pt reports that her grandson visit her and had a good time. Therapist provided supportive therapy and mindfulness CBT. Pt ended session in good emotional and behavioral control.  [Recommended Frequency of Visits: ____] : Recommended frequency of visits: [unfilled] [Return in ____ week(s)] : Return in [unfilled] week(s)

## 2025-06-26 NOTE — END OF VISIT
[Duration of Psychotherapy Visit (minutes spent in synchronous communication): ____] : Duration of Psychotherapy Visit (minutes spent in synchronous communication): [unfilled] [Individual Psychotherapy for 38-52 minutes] : Individual Psychotherapy for 38 - 52 minutes [Teletherapy Service Provided] : The services provided in this session were delivered via tele-therapy [FreeTextEntry3] : Home [FreeTextEntry5] : [facility] [Licensed Clinician] : Licensed Clinician

## 2025-06-26 NOTE — PLAN
[Supportive Therapy] : Supportive Therapy [de-identified] : Pt is a 73 year old white  female who lives by herself in an apartment. Pt reports that she fell in the street last Monday. She states that she feels achy. She stated that she hurt her left wrist "but it is not broken". She did not seek medical assistance. Therapist advised Pt to seek medical assistance. Pt states that she has appt with PCP in 8/2025. Therapist provided supportive therapy and mindfulness CBT. Pt ended session in good emotional and behavioral control.  [Recommended Frequency of Visits: ____] : Recommended frequency of visits: [unfilled] [Return in ____ week(s)] : Return in [unfilled] week(s)

## 2025-06-26 NOTE — REASON FOR VISIT
[Patient preference] : as per patient preference [Continuity of care] : to ensure continuity of care [Telehealth (audio & video) - Individual/Group] : This visit was provided via telehealth using real-time 2-way audio visual technology. [Other Location: e.g. Home (Enter Location, City,State)___] : The provider was located at [unfilled]. [Home] : The patient, [unfilled], was located at home, [unfilled], at the time of the visit. [Patient's space is appropriate for telehealth and maintains privacy/confidentiality.] : Patient's space is appropriate for telehealth and maintains privacy/confidentiality. [Participant(s) identity verified] : Participant(s) identity verified. [Verbal consent obtained from patient/other participant(s)] : Verbal consent for telehealth/telephonic services obtained from patient/other participant(s) [FreeTextEntry4] : 11:00 am [FreeTextEntry5] : 11:51 am [Patient] : Patient [FreeTextEntry1] : "I am isolated...I want to be able to be more social".

## 2025-06-27 NOTE — RISK ASSESSMENT
[No, patient denies ideation or behavior] : No, patient denies ideation or behavior [No] : No [Mood disorder] : mood disorder [Depressed mood/Anhedonia] : depressed mood/anhedonia [Hopelessness or despair] : hopelessness or despair [Chronic pain/other acute medical condition] : chronic pain or other acute medical condition [Identifies reasons for living] : identifies reasons for living [Supportive social network of family or friends] : supportive social network of family or friends [Moderate acute suicide risk] : Moderate acute suicide risk [Yes] : Safety Plan completed/updated (for individuals at risk): Yes [TextBox_32] : considers OD if thinks of killing self  [FreeTextEntry1] : Hopes to resume activities ie write articles for a local paper or take photographs, meet friends for lunch.  Has 2 close friends. [FreeTextEntry2] : "not as great as before but when I am suffering physically it makes me wonder."

## 2025-06-27 NOTE — DISCUSSION/SUMMARY
[FreeTextEntry1] : 72 yo woman with long h/o anxiety and depression now seeking to transfer care from psychiatrist of 13 years.  Has history of alcohol use disorder 2011 - 2020 but this has resolved.  Only antidepressant trial was approx 1980.  Pt reports that previous psychiatrist opposed med trials as saw pt's sx as personality traits.  For past year pt has felt increasingly hopeless and is uncertain if wants to live if cannot get helped by medication or anew therapist.  Pts medical problems most significant for ANCA vasculitis, acute kidney injury, HTN, osteopenia.  Followed actively by PCP, nephro, rheum, cardiology.    We discussed use of white noise machine as pt felt very soothed by the one in our clinic.    Positive response to low dose escitalopram and psychotherapy.    r/o dx persistent depressive disorder - "always have seen world as half empty."  Defer - Other possible meds that have issues around kidney fct: duloxetine (June: adj not necc for mild-mod impairment; not recc for severe") wellbutrin (June:"lower initial dose, less frequent, monitored closely"),  buspirone (June:"not recc for pts w severe renal impairment") gabapentin (June: 400 - 1400 mg/day in 2 doses")  Psychotherapy provided?  Minutes: Supportive counselling?   Psychoeducation? Behavioral activation? Other?  Date of Last Physical Exam:  1/6/2025 - [facility] - Maricarmen Mendoza MD - [facility] Date of Last Annual Labs:  2/19/2025 Annual Review of Systems Completed (Y/N):  deferred Tobacco Screening Completed (Y/N):  quit 1988  Plan: continue escitalopram 5 mg tab q am (incr 1/19/24) continue lorazepam 0.5 mg tab prns   next PE/labs 1/2026 weekly individual psychotherapy with Ange Dewitt LCSW RT 3 months

## 2025-06-27 NOTE — PHYSICAL EXAM
[Average] : average [Cooperative] : cooperative [Clear] : clear [Linear/Goal Directed] : linear/goal directed [WNL] : within normal limits [FreeTextEntry8] : "ok" [de-identified] : able to smile and joke

## 2025-06-27 NOTE — HISTORY OF PRESENT ILLNESS
[FreeTextEntry1] : In person.  Past 3 months:  "Better - less anxious."   Mood is "ok" - physical pains can impact at times.  [place] events in [place] are causing stress.  Feels therapy is very helpful.  lorazepam used approx 10x - takes 0.25 mg lorazepam.  Lives over a music club.  Sleep is good, other wise.   No passive SI.  One glass wine when goes out w friends for lunch - less than 1x/week.  Meeting weekly individual psychotherapy with Ange Dewitt LCSW and finding it helpful. [FreeTextEntry2] : Feels that subjective anxiety has been a problem throughout her life - felt inadequate with low self esteem.   Clinical   anxiety onset when   .  's partner "locked me out" and 's children were not supportive.  Was able to function but always felt on edge.  Started weekly psychotherapy with Saji Kinsey MD and has continued on and off - last appt was 6 weeks ago.  Treatment ended as pt asked him to call her rheum and her friend and he declined. Pt felt very hurt by this.  Only medication is lorazepam 0.5 mg tab - takes 1 tab 3 - 4 days/week.  Dx and tx for autoimmune dx has contributed to anxiety and mood problems.  "Living in my disease rather than in the world."  Current mental state has been since .  "If I am not anxious I am depressed."  Feels down most of the day on most days.  Worst in morning.  Decreased interest.  No pleasure.  Minimal hope but hoping meds can help.  Appetite comes and goes.  No overall wt changes.  Watches TV until midnight and takes an hour to fall asleep.  Wakes 5:30 am.  Anxiety is worst when can't remember or find something.  Has SI when feels that this is the way will live rest of life.  Thinks of drinking self to death or wishing would die in sleep. Unsure of frequency or duration - "I do not dwell in it."  No intent or plan.  Does not see self as at risk currently.  Has difficulty concentrating, following instructions. Isolating as does not want to talk about her medical problems - "I feel like I am abusing their friendship."   Watches TV all day.  Memory is not as good but does not think this causes problems in day-to-day life.  Deneis current or h/o psychotic or manic sx. States unable to walk much since 2023 due to damage from steroids x 6 months.  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  Unclear if it was helpful. No h/o psych meds other than lorazepam prns.  "Dr. Kinsey thought my problems were personality traits."

## 2025-07-03 NOTE — REASON FOR VISIT
[Patient preference] : as per patient preference [Continuity of care] : to ensure continuity of care [Telehealth (audio & video) - Individual/Group] : This visit was provided via telehealth using real-time 2-way audio visual technology. [Other Location: e.g. Home (Enter Location, City,State)___] : The provider was located at [unfilled]. [Home] : The patient, [unfilled], was located at home, [unfilled], at the time of the visit. [Patient's space is appropriate for telehealth and maintains privacy/confidentiality.] : Patient's space is appropriate for telehealth and maintains privacy/confidentiality. [Participant(s) identity verified] : Participant(s) identity verified. [Verbal consent obtained from patient/other participant(s)] : Verbal consent for telehealth/telephonic services obtained from patient/other participant(s) [Patient] : Patient [FreeTextEntry4] : 11:00 am [FreeTextEntry5] : 11:52 am [FreeTextEntry1] : "I am isolated...I want to be able to be more social".

## 2025-07-03 NOTE — PLAN
[Supportive Therapy] : Supportive Therapy [Recommended Frequency of Visits: ____] : Recommended frequency of visits: [unfilled] [Return in ____ week(s)] : Return in [unfilled] week(s) [de-identified] : Pt is a 73 year old white  female who lives by herself in an apartment. Pt reports that she slipped in the tub but did not fall. She states that her hand is weak. Therapist advised Pt to contact her PCP to seek medical assistance. Pt states that she has appt with PCP on 8/13/2025. Pt talked about feeling frustrated about state of [place]. Pt reports that she had appt with Eyelid Specialist who did an evaluation and advised her to make appt as needed. Therapist provided supportive therapy and mindfulness CBT. Pt ended session in good emotional and behavioral control.

## 2025-07-03 NOTE — END OF VISIT
[Duration of Psychotherapy Visit (minutes spent in synchronous communication): ____] : Duration of Psychotherapy Visit (minutes spent in synchronous communication): [unfilled] [Individual Psychotherapy for 38-52 minutes] : Individual Psychotherapy for 38 - 52 minutes [Teletherapy Service Provided] : The services provided in this session were delivered via tele-therapy [Licensed Clinician] : Licensed Clinician [FreeTextEntry3] : Home [FreeTextEntry5] : [facility]

## 2025-07-10 NOTE — PLAN
[Supportive Therapy] : Supportive Therapy [de-identified] : Pt is a 73 year old white  female who lives by herself in an apartment. Pt reports that she feels "tired and grumpy". Pt states that her wrist and chest pain are decreasing. Pt has appt with PCP on 8/13/2025. Therapist advised to Pt to seek medical assistance. Discuss coping skills and cognitive restructuring. Therapist provided supportive therapy, and mindfulness CBT. Pt ended session in good emotional and behavioral control.  [Recommended Frequency of Visits: ____] : Recommended frequency of visits: [unfilled] [Return in ____ week(s)] : Return in [unfilled] week(s)

## 2025-07-10 NOTE — END OF VISIT
[Duration of Psychotherapy Visit (minutes spent in synchronous communication): ____] : Duration of Psychotherapy Visit (minutes spent in synchronous communication): [unfilled] [Individual Psychotherapy for 38-52 minutes] : Individual Psychotherapy for 38 - 52 minutes [Teletherapy Service Provided] : The services provided in this session were delivered via tele-therapy [FreeTextEntry3] : Home [FreeTextEntry5] : [facility] [Licensed Clinician] : Licensed Clinician

## 2025-07-10 NOTE — REASON FOR VISIT
[Patient preference] : as per patient preference [Continuity of care] : to ensure continuity of care [Telehealth (audio & video) - Individual/Group] : This visit was provided via telehealth using real-time 2-way audio visual technology. [Other Location: e.g. Home (Enter Location, City,State)___] : The provider was located at [unfilled]. [Home] : The patient, [unfilled], was located at home, [unfilled], at the time of the visit. [Patient's space is appropriate for telehealth and maintains privacy/confidentiality.] : Patient's space is appropriate for telehealth and maintains privacy/confidentiality. [Participant(s) identity verified] : Participant(s) identity verified. [Verbal consent obtained from patient/other participant(s)] : Verbal consent for telehealth/telephonic services obtained from patient/other participant(s) [FreeTextEntry4] : 11:00 am [FreeTextEntry5] : 11:46 am [Patient] : Patient [FreeTextEntry1] : "I am isolated...I want to be able to be more social".

## 2025-07-16 NOTE — END OF VISIT
[Duration of Psychotherapy Visit (minutes spent in synchronous communication): ____] : Duration of Psychotherapy Visit (minutes spent in synchronous communication): [unfilled] [Individual Psychotherapy for 38-52 minutes] : Individual Psychotherapy for 38 - 52 minutes [Teletherapy Service Provided] : The services provided in this session were delivered via tele-therapy [FreeTextEntry3] : Home [FreeTextEntry5] : [facility] [Licensed Clinician] : Licensed Clinician

## 2025-07-16 NOTE — PLAN
[Supportive Therapy] : Supportive Therapy [de-identified] : Pt is a 73 year old white  female who lives by herself in an apartment. Pt reports that she has been trying to make appt with PCP, but has not been able to. She states that this makes her feel "anxious and frustrated". She also talked about behavior activation and she seems motivated to increase socialization. Therapist provided supportive therapy, and mindfulness CBT. Pt ended session in good emotional and behavioral control.  [Recommended Frequency of Visits: ____] : Recommended frequency of visits: [unfilled] [Return in ____ week(s)] : Return in [unfilled] week(s)

## 2025-07-18 NOTE — DISCUSSION/SUMMARY
[EKG obtained to assist in diagnosis and management of assessed problem(s)] : EKG obtained to assist in diagnosis and management of assessed problem(s) [FreeTextEntry1] : 73 F w/ ANCA vasculitis, subclinical coronary calcification on CT and carotid atherosclerosis, pre-DM, elevated BNP and also mild LV wall thickening on echo comes to clinic for a follow up visit.  # HTN  - Blood pressure in office well controlled  - c/w with current regimen amlodipine 5 mg, carvedilol 6.25 mg. -Echo in 1/2023 revealed mild LVH. Repeat end of 2025.   # CAD - subclinical atherosclerosis # Carotid arteries - subclinical atherosclerosis # HLD # ASCVD risk optimization # secondary prevention  - LDL goal <70  -recheck lipids today and lipoprotein a - LDL not @ goal 108 in May, but was taking atorvastatin 40 mg every other day. Has been on atorvastatin 40mg daily for a month now. - leg pain and cramping highly unlikely due to atorvastatin 40 mg due to symptoms not occuring daily  - Encouraged patient to continue healthy exercise and eating habits, focusing on a Mediterranean style of eating and aiming for the recommended 150 minutes per week of moderate physical activity.  #Lightheadedness  -continue to hydrate daily  -instructed to change positions slowly -US Carotid ordered   F/U with Dari Tan NP in 6 months.

## 2025-07-18 NOTE — HISTORY OF PRESENT ILLNESS
[FreeTextEntry1] : 73 F w/ ANCA vasculitis, subclinical coronary calcification on CT and carotid atherosclerosis, pre-DM, elevated BNP and also mild LV wall thickening on echo comes to clinic for a follow up visit.  Reports having a fall 3 weeks ago due to imbalance and working on getting into PCP. Feels lightheaded/tiredness around 11 am daily. Checks BPs at this time and WNL. Average BPs 110-119's/60-70's.   When lab work was taken in May, she was taking atorvastatin 40 mg every other day. When results came back, she started taking atorvastatin 40 mg daily, but experiencing leg cramping intermittently, not continuously or daily. Did not tolerate zetia in the past due to joint pain.   Overall, diet is well-controlled with minimal high-fat animal products, minimal cheese intake, fruit and vegetables and whole grains. Attempts to walk daily 3 or 4 blocks until legs get tired. Does not use ambulatory aide. Does PT exercise's at home 2-3 times per week.   ====================== LABS  5/27/2025: , , HDL 62,  11/2/2023- Na 133, BUN 24, Cr 1.49, GFR 37 9/13/2023- A1C 6.1%, chol 163, trig 129, HDL 52, LDL 88 7/6/2022- A1C 5.7, chol 286, HDL 65, , GFR 57  Echo 1/2023: 1. Mild left ventricular hypertrophy. There is increased LV mass and concentric hypertrophy. The anterolateral and inferolateral walls are thickened. Left ventricular systolic function is normal with an ejection fraction 65%. There is mild (Grade 1) ventricular diastolic dysfunction. 2. Right ventricular systolic function is normal. 3. No significant valvular disease. 4. Trivial pericardial effusion.  CTA 7/2022: Cardiac: 1. The calcium score is mild at 12 Agatston units, which is at the 49 percentile, adjusted for age, gender and race. 2. Non-obstructive coronary disease Non-cardiac: 1. The imaged lung cysts are unchanged. 2. Linear opacity in the left lower lobe adjacent to several lung cyst is unchanged.  Echo 7/2022: 1. Mild symmetric left ventricular hypertrophy. 2. Normal left ventricular size and systolic function. 3. Normal right ventricular size and systolic function. 4. No significant valvular disease. 5. Trivial pericardial effusion.  US carotid: 2021: plaque R and L ICA 20-49% ECHO: nl LV fx 60-65%  7/6/2022- A1C 5.7, chol 286, HDL 65, , GFR 57  CTA 7/2022: Cardiac: 1. The calcium score is mild at 12 Agatston units, which is at the 49 percentile, adjusted for age, gender and race. 2. Non-obstructive coronary disease Non-cardiac: 1. The imaged lung cysts are unchanged. 2. Linear opacity in the left lower lobe adjacent to several lung cyst is unchanged.  Echo 1/28/23- 1. Mild left ventricular hypertrophy. There is increased LV mass and concentric hypertrophy. The anterolateral and inferolateral walls are thickened. Left ventricular systolic function is normal with an ejection fraction 65%. There is mild (Grade 1) left ventricular diastolic dysfunction. 2. Right ventricular systolic function is normal. 3. No significant valvular disease. 4. Trace pericardial effusion.  Echo 7/2022: 1. Mild symmetric left ventricular hypertrophy. 2. Normal left ventricular size and systolic function. 3. Normal right ventricular size and systolic function. 4. No significant valvular disease. 5. Trivial pericardial effusion.  US carotid 2021: plaque R and L ICA 20-49% ECHO: nl LV fx 60-65%

## 2025-07-21 NOTE — HISTORY OF PRESENT ILLNESS
[FreeTextEntry1] : All Notes reviewed. Cardiology follow up noted. Dizziness and falling   [de-identified] : Needs repeat labs with hemolyzed specimen  Medication without change

## 2025-07-21 NOTE — ASSESSMENT
[FreeTextEntry1] : Falling is concerning; Carotid duplex to be done Further plans after review of studies

## 2025-07-21 NOTE — HISTORY OF PRESENT ILLNESS
[FreeTextEntry1] : All Notes reviewed. Cardiology follow up noted. Dizziness and falling   [de-identified] : Needs repeat labs with hemolyzed specimen  Medication without change

## 2025-07-24 NOTE — PLAN
[Supportive Therapy] : Supportive Therapy [Recommended Frequency of Visits: ____] : Recommended frequency of visits: [unfilled] [Return in ____ week(s)] : Return in [unfilled] week(s) [de-identified] : Pt is a 73 year old white  female who lives by herself in an apartment. Pt reports that she had some "good" and "no good" things happening lately. Pt talked to her Aide regarding her schedule and was able to come to an agreement. She also reports that she had medical appts. Therapist provided supportive therapy, and mindfulness CBT. Pt ended session in good emotional and behavioral control.

## 2025-07-24 NOTE — END OF VISIT
[Duration of Psychotherapy Visit (minutes spent in synchronous communication): ____] : Duration of Psychotherapy Visit (minutes spent in synchronous communication): [unfilled] [Individual Psychotherapy for 38-52 minutes] : Individual Psychotherapy for 38 - 52 minutes [Teletherapy Service Provided] : The services provided in this session were delivered via tele-therapy [Licensed Clinician] : Licensed Clinician [FreeTextEntry3] : Home [FreeTextEntry5] : [facility]

## 2025-07-24 NOTE — REASON FOR VISIT
[Patient preference] : as per patient preference [Continuity of care] : to ensure continuity of care [Telehealth (audio & video) - Individual/Group] : This visit was provided via telehealth using real-time 2-way audio visual technology. [Other Location: e.g. Home (Enter Location, City,State)___] : The provider was located at [unfilled]. [Home] : The patient, [unfilled], was located at home, [unfilled], at the time of the visit. [Patient's space is appropriate for telehealth and maintains privacy/confidentiality.] : Patient's space is appropriate for telehealth and maintains privacy/confidentiality. [Participant(s) identity verified] : Participant(s) identity verified. [Verbal consent obtained from patient/other participant(s)] : Verbal consent for telehealth/telephonic services obtained from patient/other participant(s) [Patient] : Patient [FreeTextEntry4] : 11:00 am [FreeTextEntry5] : 11:47 am [FreeTextEntry1] : "I am isolated...I want to be able to be more social". 02-Jul-2022 17:07

## 2025-07-24 NOTE — PLAN
[Supportive Therapy] : Supportive Therapy [Recommended Frequency of Visits: ____] : Recommended frequency of visits: [unfilled] [Return in ____ week(s)] : Return in [unfilled] week(s) [de-identified] : Pt is a 73 year old white  female who lives by herself in an apartment. Pt reports that she had some "good" and "no good" things happening lately. Pt talked to her Aide regarding her schedule and was able to come to an agreement. She also reports that she had medical appts. Therapist provided supportive therapy, and mindfulness CBT. Pt ended session in good emotional and behavioral control.

## 2025-07-24 NOTE — END OF VISIT
[Duration of Psychotherapy Visit (minutes spent in synchronous communication): ____] : Duration of Psychotherapy Visit (minutes spent in synchronous communication): [unfilled] [Individual Psychotherapy for 38-52 minutes] : Individual Psychotherapy for 38 - 52 minutes [Teletherapy Service Provided] : The services provided in this session were delivered via tele-therapy [Licensed Clinician] : Licensed Clinician [FreeTextEntry3] : Home [FreeTextEntry5] : [facility]